# Patient Record
Sex: MALE | Race: ASIAN | Employment: FULL TIME | URBAN - METROPOLITAN AREA
[De-identification: names, ages, dates, MRNs, and addresses within clinical notes are randomized per-mention and may not be internally consistent; named-entity substitution may affect disease eponyms.]

---

## 2017-01-11 ENCOUNTER — TELEPHONE (OUTPATIENT)
Dept: FAMILY MEDICINE CLINIC | Facility: CLINIC | Age: 42
End: 2017-01-11

## 2017-01-11 RX ORDER — AZITHROMYCIN 250 MG/1
TABLET, FILM COATED ORAL
Qty: 6 TABLET | Refills: 0 | Status: SHIPPED | OUTPATIENT
Start: 2017-01-11 | End: 2017-02-14

## 2017-01-11 RX ORDER — FLUTICASONE PROPIONATE 50 MCG
1 SPRAY, SUSPENSION (ML) NASAL 2 TIMES DAILY
Qty: 1 BOTTLE | Refills: 0 | Status: SHIPPED | OUTPATIENT
Start: 2017-01-11 | End: 2017-02-08

## 2017-01-11 NOTE — TELEPHONE ENCOUNTER
Dr Charleen Caban, please advise. Pt says that he is not feeling well for the past couple of days. Symptoms started with sore throat, runny nose and a little cough. Pt says cough is dry. Pt says last night he had chills, fever and body aches.   Pt says he did no

## 2017-01-11 NOTE — TELEPHONE ENCOUNTER
Pt calling back, advised of ADRIANA message. Medications sent to pharmacy. Pt verbalized understanding.

## 2017-01-11 NOTE — TELEPHONE ENCOUNTER
Pt is calling for two days he has a sore throat body pain with a slight fever pt want to know if a prescription can be sent to the pharm

## 2017-01-13 ENCOUNTER — TELEPHONE (OUTPATIENT)
Dept: INTERNAL MEDICINE CLINIC | Facility: CLINIC | Age: 42
End: 2017-01-13

## 2017-01-13 RX ORDER — BENZONATATE 100 MG/1
100 CAPSULE ORAL 2 TIMES DAILY PRN
Qty: 20 CAPSULE | Refills: 0 | Status: SHIPPED | OUTPATIENT
Start: 2017-01-13 | End: 2017-01-23

## 2017-01-13 NOTE — TELEPHONE ENCOUNTER
Pt stating that his cough still has not gone away. Pt still has none stop coughing. Pt is taking the z jagdeep which has helped with the fever and sore throat but cough still remains.

## 2017-01-13 NOTE — TELEPHONE ENCOUNTER
Spoke to pt states \" cough continues\"  Cough productive white in color for past 4 days worsened over night. denice continues to take, afebrile, no problem with ears, denies sinus issues,  Pt requesting Rx for cough. Has not tried OTC remedies.    Advised

## 2017-02-09 ENCOUNTER — LAB ENCOUNTER (OUTPATIENT)
Dept: LAB | Age: 42
End: 2017-02-09
Attending: INTERNAL MEDICINE
Payer: COMMERCIAL

## 2017-02-09 DIAGNOSIS — I10 ESSENTIAL HYPERTENSION: ICD-10-CM

## 2017-02-09 DIAGNOSIS — R80.9 PROTEINURIA, UNSPECIFIED TYPE: ICD-10-CM

## 2017-02-09 DIAGNOSIS — E55.9 VITAMIN D DEFICIENCY: ICD-10-CM

## 2017-02-09 LAB
ALBUMIN SERPL BCP-MCNC: 4 G/DL (ref 3.5–4.8)
ALBUMIN/GLOB SERPL: 1.5 {RATIO} (ref 1–2)
ALP SERPL-CCNC: 35 U/L (ref 32–100)
ALT SERPL-CCNC: 47 U/L (ref 17–63)
ANION GAP SERPL CALC-SCNC: 9 MMOL/L (ref 0–18)
AST SERPL-CCNC: 31 U/L (ref 15–41)
BASOPHILS # BLD: 0 K/UL (ref 0–0.2)
BASOPHILS NFR BLD: 0 %
BILIRUB SERPL-MCNC: 0.9 MG/DL (ref 0.3–1.2)
BILIRUB UR QL: NEGATIVE
BUN SERPL-MCNC: 10 MG/DL (ref 8–20)
BUN/CREAT SERPL: 9.6 (ref 10–20)
CALCIUM SERPL-MCNC: 8.7 MG/DL (ref 8.5–10.5)
CHLORIDE SERPL-SCNC: 103 MMOL/L (ref 95–110)
CHOLEST SERPL-MCNC: 156 MG/DL (ref 110–200)
CLARITY UR: CLEAR
CO2 SERPL-SCNC: 26 MMOL/L (ref 22–32)
COLOR UR: YELLOW
CREAT SERPL-MCNC: 1.04 MG/DL (ref 0.5–1.5)
EOSINOPHIL # BLD: 0.3 K/UL (ref 0–0.7)
EOSINOPHIL NFR BLD: 4 %
ERYTHROCYTE [DISTWIDTH] IN BLOOD BY AUTOMATED COUNT: 13.3 % (ref 11–15)
GLOBULIN PLAS-MCNC: 2.7 G/DL (ref 2.5–3.7)
GLUCOSE SERPL-MCNC: 87 MG/DL (ref 70–99)
GLUCOSE UR-MCNC: NEGATIVE MG/DL
HCT VFR BLD AUTO: 45.6 % (ref 41–52)
HDLC SERPL-MCNC: 39 MG/DL
HGB BLD-MCNC: 15.8 G/DL (ref 13.5–17.5)
HYALINE CASTS #/AREA URNS AUTO: 1 /LPF
KETONES UR-MCNC: NEGATIVE MG/DL
LDLC SERPL CALC-MCNC: 94 MG/DL (ref 0–99)
LEUKOCYTE ESTERASE UR QL STRIP.AUTO: NEGATIVE
LYMPHOCYTES # BLD: 3 K/UL (ref 1–4)
LYMPHOCYTES NFR BLD: 40 %
MCH RBC QN AUTO: 30.3 PG (ref 27–32)
MCHC RBC AUTO-ENTMCNC: 34.6 G/DL (ref 32–37)
MCV RBC AUTO: 87.6 FL (ref 80–100)
MONOCYTES # BLD: 0.6 K/UL (ref 0–1)
MONOCYTES NFR BLD: 8 %
NEUTROPHILS # BLD AUTO: 3.6 K/UL (ref 1.8–7.7)
NEUTROPHILS NFR BLD: 48 %
NITRITE UR QL STRIP.AUTO: NEGATIVE
NONHDLC SERPL-MCNC: 117 MG/DL
OSMOLALITY UR CALC.SUM OF ELEC: 284 MOSM/KG (ref 275–295)
PH UR: 7 [PH] (ref 5–8)
PLATELET # BLD AUTO: 202 K/UL (ref 140–400)
PMV BLD AUTO: 8.8 FL (ref 7.4–10.3)
POTASSIUM SERPL-SCNC: 3.6 MMOL/L (ref 3.3–5.1)
PROT SERPL-MCNC: 6.7 G/DL (ref 5.9–8.4)
PROT UR-MCNC: NEGATIVE MG/DL
RBC # BLD AUTO: 5.2 M/UL (ref 4.5–5.9)
RBC #/AREA URNS AUTO: 2 /HPF
SODIUM SERPL-SCNC: 138 MMOL/L (ref 136–144)
SP GR UR STRIP: 1.01 (ref 1–1.03)
TRIGL SERPL-MCNC: 115 MG/DL (ref 1–149)
TSH SERPL-ACNC: 1.64 UIU/ML (ref 0.34–5.6)
UROBILINOGEN UR STRIP-ACNC: <2
VIT C UR-MCNC: NEGATIVE MG/DL
WBC # BLD AUTO: 7.4 K/UL (ref 4–11)
WBC #/AREA URNS AUTO: 2 /HPF

## 2017-02-09 PROCEDURE — 80053 COMPREHEN METABOLIC PANEL: CPT

## 2017-02-09 PROCEDURE — 80061 LIPID PANEL: CPT

## 2017-02-09 PROCEDURE — 82306 VITAMIN D 25 HYDROXY: CPT

## 2017-02-09 PROCEDURE — 36415 COLL VENOUS BLD VENIPUNCTURE: CPT

## 2017-02-09 PROCEDURE — 81001 URINALYSIS AUTO W/SCOPE: CPT

## 2017-02-09 PROCEDURE — 85025 COMPLETE CBC W/AUTO DIFF WBC: CPT

## 2017-02-09 PROCEDURE — 84443 ASSAY THYROID STIM HORMONE: CPT

## 2017-02-10 LAB — 25(OH)D3 SERPL-MCNC: 27.2 NG/ML

## 2017-02-14 ENCOUNTER — HOSPITAL ENCOUNTER (OUTPATIENT)
Dept: GENERAL RADIOLOGY | Facility: HOSPITAL | Age: 42
Discharge: HOME | End: 2017-02-14
Attending: INTERNAL MEDICINE
Payer: COMMERCIAL

## 2017-02-14 ENCOUNTER — OFFICE VISIT (OUTPATIENT)
Dept: INTERNAL MEDICINE CLINIC | Facility: CLINIC | Age: 42
End: 2017-02-14

## 2017-02-14 VITALS
BODY MASS INDEX: 28 KG/M2 | DIASTOLIC BLOOD PRESSURE: 80 MMHG | RESPIRATION RATE: 16 BRPM | HEART RATE: 56 BPM | HEIGHT: 64 IN | WEIGHT: 164 LBS | SYSTOLIC BLOOD PRESSURE: 128 MMHG

## 2017-02-14 DIAGNOSIS — M54.6 MIDLINE THORACIC BACK PAIN, UNSPECIFIED CHRONICITY: ICD-10-CM

## 2017-02-14 DIAGNOSIS — I10 ESSENTIAL HYPERTENSION: ICD-10-CM

## 2017-02-14 DIAGNOSIS — E55.9 VITAMIN D DEFICIENCY: ICD-10-CM

## 2017-02-14 DIAGNOSIS — R80.9 PROTEINURIA, UNSPECIFIED TYPE: ICD-10-CM

## 2017-02-14 DIAGNOSIS — M54.6 MIDLINE THORACIC BACK PAIN, UNSPECIFIED CHRONICITY: Primary | ICD-10-CM

## 2017-02-14 DIAGNOSIS — Z00.00 ROUTINE PHYSICAL EXAMINATION: ICD-10-CM

## 2017-02-14 PROCEDURE — 99213 OFFICE O/P EST LOW 20 MIN: CPT | Performed by: INTERNAL MEDICINE

## 2017-02-14 PROCEDURE — 72072 X-RAY EXAM THORAC SPINE 3VWS: CPT

## 2017-02-14 PROCEDURE — 99396 PREV VISIT EST AGE 40-64: CPT | Performed by: INTERNAL MEDICINE

## 2017-02-14 RX ORDER — DILTIAZEM HYDROCHLORIDE 180 MG/1
CAPSULE, COATED, EXTENDED RELEASE ORAL
Qty: 90 CAPSULE | Refills: 2 | Status: SHIPPED | OUTPATIENT
Start: 2017-02-14 | End: 2018-03-08

## 2017-02-14 RX ORDER — LOSARTAN POTASSIUM 100 MG/1
TABLET ORAL
Qty: 90 TABLET | Refills: 2 | Status: SHIPPED | OUTPATIENT
Start: 2017-02-14 | End: 2018-03-08

## 2017-02-14 NOTE — ASSESSMENT & PLAN NOTE
History of proteinuria in the past but has resolved with sustained blood pressure control. Currently on losartan and diltiazem. Advised to avoid all ibuprofen like medications as well as aspirin.   Drink plenty of fluids

## 2017-02-14 NOTE — ASSESSMENT & PLAN NOTE
Blood pressure 128/80, pulse 56, resp. rate 16, height 5' 4\" (1.626 m), weight 164 lb (74.39 kg).    Stable bp on diltiazem and losartan,tolerating meds well.renal functions and egfr looks normal.

## 2017-02-14 NOTE — PROGRESS NOTES
HPI:    Patient ID: Anastacia Mondragon is a 35year old male.     HPI Comments: Physical    Labs  Notes Recorded by Florencia Nuno MD on 2/10/2017 at 8:24 PM  The vitamin D levels are slightly low– please restart on vitamin D at 50,000 units once a week for 12 include no abdominal pain, bladder incontinence, bowel incontinence, chest pain, leg pain, paresthesias or pelvic pain. Risk factors include sedentary lifestyle, poor posture and lack of exercise.  He has tried home exercises and analgesics for the symptoms Bowel sounds are normal. Hernia confirmed negative in the right inguinal area and confirmed negative in the left inguinal area.    Genitourinary: Rectum normal, prostate normal, testes normal and penis normal. Rectal exam shows no external hemorrhoid, no in pain,seems to persist.  No palpable abnormalities at this time. Posture correction as discussed.   Start therapy after x rays are completed         Relevant Orders    PHYSICAL THERAPY - INTERNAL    Proteinuria     History of proteinuria in the past but has

## 2017-02-14 NOTE — ASSESSMENT & PLAN NOTE
Labs did show low vitamin D levels. Patient has been restarted on vitamin D, advised to  medications from the pharmacy has this was sent in on February 10. Will recheck labs with the next blood draw.

## 2017-02-14 NOTE — PATIENT INSTRUCTIONS
Problem List Items Addressed This Visit        Unprioritized    Hypertension     Blood pressure 128/80, pulse 56, resp. rate 16, height 5' 4\" (1.626 m), weight 164 lb (74.39 kg).    Stable bp on diltiazem and losartan,tolerating meds well.renal functions a

## 2017-02-14 NOTE — ASSESSMENT & PLAN NOTE
Interscapular pain,seems to persist.  No palpable abnormalities at this time. Posture correction as discussed.   Start therapy after x rays are completed

## 2017-02-14 NOTE — ASSESSMENT & PLAN NOTE
Normal exam.  Labs completed recently looked stable. Skin check with a few scattered nevi–none suspicious. Hernial orifices intact. No cervical, inguinal, axillary lymphadenopathy. Rectal exam completed–brown stools guaiac negative.   No palpable abnorm

## 2017-06-06 ENCOUNTER — APPOINTMENT (OUTPATIENT)
Dept: LAB | Facility: HOSPITAL | Age: 42
End: 2017-06-06
Attending: INTERNAL MEDICINE
Payer: COMMERCIAL

## 2017-06-06 ENCOUNTER — TELEPHONE (OUTPATIENT)
Dept: INTERNAL MEDICINE CLINIC | Facility: CLINIC | Age: 42
End: 2017-06-06

## 2017-06-06 DIAGNOSIS — I10 ESSENTIAL HYPERTENSION: ICD-10-CM

## 2017-06-06 DIAGNOSIS — E55.9 VITAMIN D DEFICIENCY: ICD-10-CM

## 2017-06-06 DIAGNOSIS — R80.9 PROTEINURIA, UNSPECIFIED TYPE: ICD-10-CM

## 2017-06-06 PROCEDURE — 81001 URINALYSIS AUTO W/SCOPE: CPT

## 2017-06-06 PROCEDURE — 82306 VITAMIN D 25 HYDROXY: CPT

## 2017-06-06 PROCEDURE — 82570 ASSAY OF URINE CREATININE: CPT

## 2017-06-06 PROCEDURE — 36415 COLL VENOUS BLD VENIPUNCTURE: CPT

## 2017-06-06 PROCEDURE — 80048 BASIC METABOLIC PNL TOTAL CA: CPT

## 2017-06-06 PROCEDURE — 82043 UR ALBUMIN QUANTITATIVE: CPT

## 2017-06-06 NOTE — TELEPHONE ENCOUNTER
Patient wants to come in for his 4 month check up in the month of June. There are no appointments available and he would like to be seen before [place] leaves [place] of [place]th.

## 2017-06-20 ENCOUNTER — OFFICE VISIT (OUTPATIENT)
Dept: INTERNAL MEDICINE CLINIC | Facility: CLINIC | Age: 42
End: 2017-06-20

## 2017-06-20 VITALS
DIASTOLIC BLOOD PRESSURE: 78 MMHG | WEIGHT: 162 LBS | BODY MASS INDEX: 27.66 KG/M2 | SYSTOLIC BLOOD PRESSURE: 120 MMHG | HEIGHT: 64 IN | RESPIRATION RATE: 18 BRPM | TEMPERATURE: 98 F | HEART RATE: 56 BPM

## 2017-06-20 DIAGNOSIS — E55.9 VITAMIN D DEFICIENCY: ICD-10-CM

## 2017-06-20 DIAGNOSIS — I10 ESSENTIAL HYPERTENSION: Primary | ICD-10-CM

## 2017-06-20 PROCEDURE — 99212 OFFICE O/P EST SF 10 MIN: CPT | Performed by: INTERNAL MEDICINE

## 2017-06-20 PROCEDURE — 99214 OFFICE O/P EST MOD 30 MIN: CPT | Performed by: INTERNAL MEDICINE

## 2017-06-20 RX ORDER — HYDROCHLOROTHIAZIDE 12.5 MG/1
12.5 TABLET ORAL DAILY
Qty: 30 TABLET | Refills: 4 | Status: SHIPPED | OUTPATIENT
Start: 2017-06-20 | End: 2018-03-21

## 2017-06-20 NOTE — ASSESSMENT & PLAN NOTE
Vitamin D levels look normal at this time. Continue on an over-the-counter vitamin D supplement at 2000 units daily. Recheck labs within next blood draw.

## 2017-06-20 NOTE — PATIENT INSTRUCTIONS
Problem List Items Addressed This Visit        Unprioritized    Hypertension - Primary     Blood pressure 120/78, pulse 56, temperature 97.9 °F (36.6 °C), temperature source Oral, resp. rate 18, height 5' 4\" (1.626 m), weight 162 lb (73.483 kg).    Blood p

## 2017-06-20 NOTE — PROGRESS NOTES
HPI:    Patient ID: Froylan Smith is a 38year old male. Hypertension  This is a chronic problem. The current episode started more than 1 year ago. The problem has been gradually improving since onset. The problem is controlled.  Associated symptoms in atraumatic. Right Ear: External ear normal.   Left Ear: External ear normal.   Nose: Nose normal.   Mouth/Throat: Oropharynx is clear and moist. No oropharyngeal exudate.    Eyes: Conjunctivae and EOM are normal. Pupils are equal, round, and reactive to l Continue on an over-the-counter vitamin D supplement at 2000 units daily. Recheck labs within next blood draw. Relevant Orders    VITAMIN B12    VITAMIN D, 25-HYDROXY      Other Visit Diagnoses    None.         Return in about 4 months (around 10/2

## 2017-06-20 NOTE — ASSESSMENT & PLAN NOTE
Blood pressure 120/78, pulse 56, temperature 97.9 °F (36.6 °C), temperature source Oral, resp. rate 18, height 5' 4\" (1.626 m), weight 162 lb (73.483 kg).    Blood pressure seems well controlled, advised to continue on losartan at 100 mg 1 tablet once tristan

## 2017-12-22 ENCOUNTER — LAB ENCOUNTER (OUTPATIENT)
Dept: LAB | Facility: HOSPITAL | Age: 42
End: 2017-12-22
Attending: INTERNAL MEDICINE
Payer: COMMERCIAL

## 2017-12-22 DIAGNOSIS — E55.9 VITAMIN D DEFICIENCY: ICD-10-CM

## 2017-12-22 DIAGNOSIS — I10 ESSENTIAL HYPERTENSION: ICD-10-CM

## 2017-12-22 PROCEDURE — 84443 ASSAY THYROID STIM HORMONE: CPT

## 2017-12-22 PROCEDURE — 36415 COLL VENOUS BLD VENIPUNCTURE: CPT

## 2017-12-22 PROCEDURE — 82607 VITAMIN B-12: CPT

## 2017-12-22 PROCEDURE — 82306 VITAMIN D 25 HYDROXY: CPT

## 2017-12-22 PROCEDURE — 80053 COMPREHEN METABOLIC PANEL: CPT

## 2017-12-22 PROCEDURE — 85025 COMPLETE CBC W/AUTO DIFF WBC: CPT

## 2017-12-22 PROCEDURE — 80061 LIPID PANEL: CPT

## 2018-03-08 ENCOUNTER — OFFICE VISIT (OUTPATIENT)
Dept: INTERNAL MEDICINE CLINIC | Facility: CLINIC | Age: 43
End: 2018-03-08

## 2018-03-08 VITALS
DIASTOLIC BLOOD PRESSURE: 90 MMHG | BODY MASS INDEX: 27.83 KG/M2 | HEIGHT: 64 IN | TEMPERATURE: 98 F | RESPIRATION RATE: 16 BRPM | WEIGHT: 163 LBS | HEART RATE: 68 BPM | SYSTOLIC BLOOD PRESSURE: 140 MMHG

## 2018-03-08 DIAGNOSIS — M72.2 PLANTAR FASCIITIS: Primary | ICD-10-CM

## 2018-03-08 DIAGNOSIS — I10 ESSENTIAL HYPERTENSION: ICD-10-CM

## 2018-03-08 PROCEDURE — 99214 OFFICE O/P EST MOD 30 MIN: CPT | Performed by: INTERNAL MEDICINE

## 2018-03-08 PROCEDURE — 99212 OFFICE O/P EST SF 10 MIN: CPT | Performed by: INTERNAL MEDICINE

## 2018-03-08 RX ORDER — LOSARTAN POTASSIUM 100 MG/1
TABLET ORAL
Qty: 90 TABLET | Refills: 2 | Status: SHIPPED | OUTPATIENT
Start: 2018-03-08 | End: 2019-02-11

## 2018-03-08 RX ORDER — DILTIAZEM HYDROCHLORIDE 180 MG/1
CAPSULE, COATED, EXTENDED RELEASE ORAL
Qty: 90 CAPSULE | Refills: 2 | Status: SHIPPED | OUTPATIENT
Start: 2018-03-08 | End: 2019-05-07

## 2018-03-09 NOTE — ASSESSMENT & PLAN NOTE
Blood pressure 140/90, pulse 68, temperature 98.1 °F (36.7 °C), temperature source Oral, resp. rate 16, height 5' 4\" (1.626 m), weight 163 lb (73.9 kg). Recheck the blood pressure bilateral arms–remains about the same.   Patient is currently on losarta

## 2018-03-09 NOTE — PATIENT INSTRUCTIONS
Problem List Items Addressed This Visit        Unprioritized    Hypertension     Blood pressure 140/90, pulse 68, temperature 98.1 °F (36.7 °C), temperature source Oral, resp. rate 16, height 5' 4\" (1.626 m), weight 163 lb (73.9 kg).      Recheck the blood

## 2018-03-12 NOTE — PROGRESS NOTES
HPI:    Patient ID: Anastacia Mondragon is a 38year old male. Hypertension   This is a chronic problem. The current episode started more than 1 year ago. The problem has been gradually improving since onset. The problem is controlled.  Risk factors for liliana Normal rate, regular rhythm, normal heart sounds and intact distal pulses. Pulmonary/Chest: Effort normal and breath sounds normal.   Abdominal: Soft. Bowel sounds are normal.   Musculoskeletal: Normal range of motion.    Lymphadenopathy:     He has no c MG Oral Capsule SR 24 Hr 90 capsule 2      Si CAPSULE PO Q D      losartan 100 MG Oral Tab 90 tablet 2      Si tab po q d           Imaging & Referrals:  PODIATRY - INTERNAL       WC#[id number]

## 2018-03-21 ENCOUNTER — NURSE ONLY (OUTPATIENT)
Dept: INTERNAL MEDICINE CLINIC | Facility: CLINIC | Age: 43
End: 2018-03-21

## 2018-03-21 VITALS — DIASTOLIC BLOOD PRESSURE: 85 MMHG | SYSTOLIC BLOOD PRESSURE: 132 MMHG

## 2018-03-21 DIAGNOSIS — I10 ESSENTIAL HYPERTENSION: Primary | ICD-10-CM

## 2018-03-21 NOTE — PROGRESS NOTES
Patient is here for blood pressure check. Blood pressure 132/85, medications reviewed with patient, losartan 100mg once a day and diltiazem 180mg daily. Patient feeling well advised we will forward to ADRIANA.

## 2018-04-01 DIAGNOSIS — I10 ESSENTIAL HYPERTENSION: ICD-10-CM

## 2018-04-01 RX ORDER — DILTIAZEM HYDROCHLORIDE 180 MG/1
CAPSULE, COATED, EXTENDED RELEASE ORAL
Qty: 90 CAPSULE | Refills: 2 | Status: SHIPPED | OUTPATIENT
Start: 2018-04-01 | End: 2018-05-08

## 2018-04-04 ENCOUNTER — HOSPITAL ENCOUNTER (OUTPATIENT)
Dept: CT IMAGING | Age: 43
Discharge: HOME | End: 2018-04-04
Attending: INTERNAL MEDICINE

## 2018-04-04 DIAGNOSIS — Z13.6 SCREENING FOR HEART DISEASE: ICD-10-CM

## 2018-04-04 NOTE — PROGRESS NOTES
[facility] [address] SCORE=0  FK=965/84  Cholestec labs as follows:  NI=680  HDL=48  DCC=539  EY=983  GLUCOSE=98 fasting  All results and risk factors discussed with patient; all questions and concerns addressed.   Educational rothman

## 2018-04-24 ENCOUNTER — APPOINTMENT (OUTPATIENT)
Dept: LAB | Age: 43
End: 2018-04-24
Attending: INTERNAL MEDICINE
Payer: COMMERCIAL

## 2018-04-24 DIAGNOSIS — I10 ESSENTIAL HYPERTENSION: ICD-10-CM

## 2018-04-24 PROCEDURE — 80053 COMPREHEN METABOLIC PANEL: CPT

## 2018-04-24 PROCEDURE — 82570 ASSAY OF URINE CREATININE: CPT

## 2018-04-24 PROCEDURE — 82043 UR ALBUMIN QUANTITATIVE: CPT

## 2018-04-24 PROCEDURE — 81001 URINALYSIS AUTO W/SCOPE: CPT

## 2018-04-24 PROCEDURE — 36415 COLL VENOUS BLD VENIPUNCTURE: CPT

## 2018-05-08 ENCOUNTER — OFFICE VISIT (OUTPATIENT)
Dept: INTERNAL MEDICINE CLINIC | Facility: CLINIC | Age: 43
End: 2018-05-08

## 2018-05-08 VITALS
RESPIRATION RATE: 16 BRPM | BODY MASS INDEX: 27.49 KG/M2 | HEIGHT: 64 IN | DIASTOLIC BLOOD PRESSURE: 82 MMHG | HEART RATE: 62 BPM | WEIGHT: 161 LBS | SYSTOLIC BLOOD PRESSURE: 132 MMHG

## 2018-05-08 DIAGNOSIS — I10 ESSENTIAL HYPERTENSION: Primary | ICD-10-CM

## 2018-05-08 PROCEDURE — 99214 OFFICE O/P EST MOD 30 MIN: CPT | Performed by: INTERNAL MEDICINE

## 2018-05-08 PROCEDURE — 99212 OFFICE O/P EST SF 10 MIN: CPT | Performed by: INTERNAL MEDICINE

## 2018-05-09 NOTE — ASSESSMENT & PLAN NOTE
Blood pressure 132/82, pulse 62, resp. rate 16, height 5' 4\" (1.626 m), weight 161 lb (73 kg). Blood pressures seem much better at this time. These were slightly elevated at his last office visit.   He has been watching his diet carefully at this time

## 2018-05-09 NOTE — PATIENT INSTRUCTIONS
Problem List Items Addressed This Visit        Unprioritized    Hypertension - Primary     Blood pressure 132/82, pulse 62, resp. rate 16, height 5' 4\" (1.626 m), weight 161 lb (73 kg). Blood pressures seem much better at this time.   These were slight

## 2018-05-09 NOTE — PROGRESS NOTES
HPI:    Patient ID: Justine Smith is a 38year old male. The urine test looked normal- no protein in the urine at this time. Kidney functions liver functions, electrolytes and sugars look normal.      Hypertension   This is a chronic problem.  The cur External ear normal.   Nose: Nose normal.   Mouth/Throat: Oropharynx is clear and moist. No oropharyngeal exudate. Eyes: Conjunctivae and EOM are normal. Pupils are equal, round, and reactive to light. Neck: Normal range of motion. Neck supple.  No JVD this encounter    Imaging & Referrals:  None       HR#[id number]

## 2018-06-05 ENCOUNTER — TELEPHONE (OUTPATIENT)
Dept: INTERNAL MEDICINE CLINIC | Facility: CLINIC | Age: 43
End: 2018-06-05

## 2018-06-05 NOTE — TELEPHONE ENCOUNTER
Pt states he saw Dr Ashley Stone a month ago for stuffy nose, states Dr Ashley Stone advised him to take OTC allergy meds for a month and call back if no improvement and she would prescribed nasal spray. Pt states he has been taking zyrtec with no relief.     Please ad

## 2018-06-06 RX ORDER — AZITHROMYCIN 250 MG/1
TABLET, FILM COATED ORAL
Qty: 6 TABLET | Refills: 0 | Status: SHIPPED | OUTPATIENT
Start: 2018-06-06 | End: 2018-10-17 | Stop reason: ALTCHOICE

## 2018-06-06 RX ORDER — FLUTICASONE PROPIONATE 50 MCG
1 SPRAY, SUSPENSION (ML) NASAL 2 TIMES DAILY
Qty: 1 INHALER | Refills: 3 | Status: SHIPPED | OUTPATIENT
Start: 2018-06-06 | End: 2019-03-26

## 2018-06-06 NOTE — TELEPHONE ENCOUNTER
Dr. Landon Haddad: patient called back, Patient called yesterday stating he is very stuffy and unable to breathe through nose.     Today he states he forgot to mention, he has started with cough 3 days ago, and has some chest congestion; denied any fever, no sinus

## 2018-06-06 NOTE — TELEPHONE ENCOUNTER
Flonase 1 puff each nostril 2 times daily– 1 bottle, 3 refills. Continue the Zyrtec as directed.   Z-Farooq as directed

## 2018-06-06 NOTE — TELEPHONE ENCOUNTER
Pt returned call, reviewed Dr Jacob Richardson orders 6/6/18 with pt who verbalized understanding and agreed with plan.

## 2018-10-12 ENCOUNTER — LAB ENCOUNTER (OUTPATIENT)
Dept: LAB | Facility: HOSPITAL | Age: 43
End: 2018-10-12
Attending: INTERNAL MEDICINE
Payer: COMMERCIAL

## 2018-10-12 DIAGNOSIS — I10 ESSENTIAL HYPERTENSION: ICD-10-CM

## 2018-10-12 PROCEDURE — 85025 COMPLETE CBC W/AUTO DIFF WBC: CPT

## 2018-10-12 PROCEDURE — 80053 COMPREHEN METABOLIC PANEL: CPT

## 2018-10-12 PROCEDURE — 82043 UR ALBUMIN QUANTITATIVE: CPT

## 2018-10-12 PROCEDURE — 84443 ASSAY THYROID STIM HORMONE: CPT

## 2018-10-12 PROCEDURE — 80061 LIPID PANEL: CPT

## 2018-10-12 PROCEDURE — 82570 ASSAY OF URINE CREATININE: CPT

## 2018-10-12 PROCEDURE — 36415 COLL VENOUS BLD VENIPUNCTURE: CPT

## 2018-10-12 PROCEDURE — 81001 URINALYSIS AUTO W/SCOPE: CPT

## 2018-10-17 ENCOUNTER — OFFICE VISIT (OUTPATIENT)
Dept: INTERNAL MEDICINE CLINIC | Facility: CLINIC | Age: 43
End: 2018-10-17

## 2018-10-17 VITALS
SYSTOLIC BLOOD PRESSURE: 139 MMHG | HEART RATE: 57 BPM | TEMPERATURE: 98 F | BODY MASS INDEX: 27.69 KG/M2 | WEIGHT: 162.19 LBS | RESPIRATION RATE: 18 BRPM | DIASTOLIC BLOOD PRESSURE: 90 MMHG | HEIGHT: 64 IN

## 2018-10-17 DIAGNOSIS — R80.9 PROTEINURIA, UNSPECIFIED TYPE: Primary | ICD-10-CM

## 2018-10-17 DIAGNOSIS — I10 ESSENTIAL HYPERTENSION: ICD-10-CM

## 2018-10-17 DIAGNOSIS — M79.673 PAIN OF FOOT, UNSPECIFIED LATERALITY: ICD-10-CM

## 2018-10-17 PROCEDURE — 99212 OFFICE O/P EST SF 10 MIN: CPT | Performed by: INTERNAL MEDICINE

## 2018-10-17 PROCEDURE — 99214 OFFICE O/P EST MOD 30 MIN: CPT | Performed by: INTERNAL MEDICINE

## 2018-10-18 NOTE — PROGRESS NOTES
HPI:    Patient ID: Edu Riley is a 37year old male. Labs indicate slightly elevated LDL cholesterol over the baseline. Urine protein levels are slightly higher compared to the last few years. Creatinine level looks stable.   Blood counts and elizabeth 27.84 kg/m². PHYSICAL EXAM:   Physical Exam   Constitutional: He is oriented to person, place, and time. He appears well-developed and well-nourished. HENT:   Head: Normocephalic and atraumatic.    Right Ear: External ear normal.   Left Ear: External e very well controlled. However urine protein levels seem to be gradually rising. Patient has been advised to avoid all ibuprofen, Advil, Aleve, Motrin, naproxen like medications over-the-counter.   He is advised to take only Tylenol if necessary 500 mg 2

## 2018-10-18 NOTE — ASSESSMENT & PLAN NOTE
History of proteinuria which had completely resolved. His blood pressures were very well controlled. However urine protein levels seem to be gradually rising.     Patient has been advised to avoid all ibuprofen, Advil, Aleve, Motrin, naproxen like medicat

## 2018-10-18 NOTE — PATIENT INSTRUCTIONS
Problem List Items Addressed This Visit        Unprioritized    Hypertension     Blood pressure 139/90, pulse 57, temperature 97.5 °F (36.4 °C), temperature source Oral, resp. rate 18, height 5' 4\" (1.626 m), weight 162 lb 3.2 oz (73.6 kg).   Blood pressur

## 2018-10-18 NOTE — ASSESSMENT & PLAN NOTE
Blood pressure 139/90, pulse 57, temperature 97.5 °F (36.4 °C), temperature source Oral, resp. rate 18, height 5' 4\" (1.626 m), weight 162 lb 3.2 oz (73.6 kg). Blood pressure remains persistently elevate.   Patient tells me that he has not taken his dilti

## 2018-11-29 ENCOUNTER — NURSE ONLY (OUTPATIENT)
Dept: INTERNAL MEDICINE CLINIC | Facility: CLINIC | Age: 43
End: 2018-11-29

## 2018-11-29 VITALS — SYSTOLIC BLOOD PRESSURE: 126 MMHG | DIASTOLIC BLOOD PRESSURE: 77 MMHG | HEART RATE: 63 BPM | RESPIRATION RATE: 18 BRPM

## 2018-11-29 DIAGNOSIS — I10 ESSENTIAL HYPERTENSION: ICD-10-CM

## 2018-11-29 PROCEDURE — 99212 OFFICE O/P EST SF 10 MIN: CPT | Performed by: INTERNAL MEDICINE

## 2018-11-29 NOTE — PROGRESS NOTES
Pt presents for BP check, per ADRIANA's instructions during 10/17/18 office visit. Pt states he has been compliant with daily medications as follows: losartan 100mg 1 tab daily and Diltiazem 180mg 1 capsule daily.      BP left arm, sittin/77  HR: 63

## 2018-12-13 ENCOUNTER — HOSPITAL ENCOUNTER (OUTPATIENT)
Dept: GENERAL RADIOLOGY | Age: 43
Discharge: HOME | End: 2018-12-13
Attending: PODIATRIST
Payer: COMMERCIAL

## 2018-12-13 ENCOUNTER — OFFICE VISIT (OUTPATIENT)
Dept: PODIATRY CLINIC | Facility: CLINIC | Age: 43
End: 2018-12-13

## 2018-12-13 DIAGNOSIS — M79.671 RIGHT FOOT PAIN: ICD-10-CM

## 2018-12-13 DIAGNOSIS — M79.671 RIGHT FOOT PAIN: Primary | ICD-10-CM

## 2018-12-13 DIAGNOSIS — D36.10 NEUROMA: ICD-10-CM

## 2018-12-13 PROCEDURE — 99243 OFF/OP CNSLTJ NEW/EST LOW 30: CPT | Performed by: PODIATRIST

## 2018-12-13 PROCEDURE — 99212 OFFICE O/P EST SF 10 MIN: CPT | Performed by: PODIATRIST

## 2018-12-13 PROCEDURE — 73630 X-RAY EXAM OF FOOT: CPT | Performed by: PODIATRIST

## 2018-12-13 RX ORDER — METHYLPREDNISOLONE 4 MG/1
TABLET ORAL
Qty: 1 PACKAGE | Refills: 0 | Status: SHIPPED | OUTPATIENT
Start: 2018-12-13 | End: 2019-01-15 | Stop reason: ALTCHOICE

## 2018-12-13 NOTE — PROGRESS NOTES
HPI:    Patient ID: Anastacia Mondragon is a 37year old male. Pleasant 67-year-old male presents as a new patient to me on consult from [address] [place] [address]. Since chief complaint is right forefoot pain and discomfort.   This been present for approximately 2 months a with consideration for a cortisone injection if not better.   He is well-informed and indicates an understanding of my instructions         ASSESSMENT/PLAN:   Right foot pain  (primary encounter diagnosis)  Neuroma    No orders of the defined types were jennifer

## 2018-12-14 ENCOUNTER — TELEPHONE (OUTPATIENT)
Dept: PODIATRY CLINIC | Facility: CLINIC | Age: 43
End: 2018-12-14

## 2019-01-15 ENCOUNTER — OFFICE VISIT (OUTPATIENT)
Dept: PODIATRY CLINIC | Facility: CLINIC | Age: 44
End: 2019-01-15

## 2019-01-15 DIAGNOSIS — D36.10 NEUROMA: Primary | ICD-10-CM

## 2019-01-15 PROCEDURE — 99213 OFFICE O/P EST LOW 20 MIN: CPT | Performed by: PODIATRIST

## 2019-01-15 PROCEDURE — 99212 OFFICE O/P EST SF 10 MIN: CPT | Performed by: PODIATRIST

## 2019-01-15 NOTE — PROGRESS NOTES
HPI:    Patient ID: Jovi Manzanares is a 37year old male. This 20-year-old male presents for follow-up in reference to the plantar right forefoot pain.   When I saw this patient on the last visit he was given a Medrol Dosepak for symptoms that were consis

## 2019-02-11 DIAGNOSIS — I10 ESSENTIAL HYPERTENSION: ICD-10-CM

## 2019-02-11 RX ORDER — LOSARTAN POTASSIUM 100 MG/1
TABLET ORAL
Qty: 90 TABLET | Refills: 0 | Status: SHIPPED | OUTPATIENT
Start: 2019-02-11 | End: 2019-05-07

## 2019-02-12 NOTE — TELEPHONE ENCOUNTER
Refill passed per [facility], [facility] protocol.   Hypertensive Medications  Protocol Criteria:  · Appointment scheduled in the past 6 months or in the next 3 months  · BMP or CMP in the past 12 months  · Creatinine result < 2  Recent Outpatient Visits

## 2019-03-25 ENCOUNTER — NURSE TRIAGE (OUTPATIENT)
Dept: OTHER | Age: 44
End: 2019-03-25

## 2019-03-25 NOTE — TELEPHONE ENCOUNTER
Action Requested: Summary for Provider     []  Critical Lab, Recommendations Needed  [x] Need Additional Advice  []   FYI    []   Need Orders  [] Need Medications Sent to Pharmacy  []  Other     SUMMARY: Patient requesting appt with ADRIANA only tomorrow for i

## 2019-03-26 ENCOUNTER — OFFICE VISIT (OUTPATIENT)
Dept: INTERNAL MEDICINE CLINIC | Facility: CLINIC | Age: 44
End: 2019-03-26

## 2019-03-26 VITALS
BODY MASS INDEX: 28.17 KG/M2 | WEIGHT: 165 LBS | TEMPERATURE: 98 F | HEIGHT: 64 IN | SYSTOLIC BLOOD PRESSURE: 149 MMHG | DIASTOLIC BLOOD PRESSURE: 87 MMHG | RESPIRATION RATE: 16 BRPM | HEART RATE: 65 BPM

## 2019-03-26 DIAGNOSIS — J03.91 RECURRENT TONSILLITIS: Primary | ICD-10-CM

## 2019-03-26 LAB
CONTROL LINE PRESENT WITH A CLEAR BACKGROUND (YES/NO): YES YES/NO
KIT LOT #: NORMAL NUMERIC
STREP GRP A CUL-SCR: NEGATIVE

## 2019-03-26 PROCEDURE — 87880 STREP A ASSAY W/OPTIC: CPT | Performed by: INTERNAL MEDICINE

## 2019-03-26 PROCEDURE — 99214 OFFICE O/P EST MOD 30 MIN: CPT | Performed by: INTERNAL MEDICINE

## 2019-03-26 PROCEDURE — 99212 OFFICE O/P EST SF 10 MIN: CPT | Performed by: INTERNAL MEDICINE

## 2019-03-26 RX ORDER — OMEPRAZOLE 40 MG/1
CAPSULE, DELAYED RELEASE ORAL
Qty: 30 CAPSULE | Refills: 3 | Status: SHIPPED | OUTPATIENT
Start: 2019-03-26 | End: 2019-06-17

## 2019-03-26 NOTE — ASSESSMENT & PLAN NOTE
Current episodes of sore throat, tonsillar infection, 1-2 episodes associated with fevers, chills.   Last treatment with antibiotics–the fourth treatment with Augmentin completed about 1 week back and he has begun developing recurrent sore throat about 5 da

## 2019-03-26 NOTE — PATIENT INSTRUCTIONS
Problem List Items Addressed This Visit        Unprioritized    Recurrent tonsillitis - Primary     Current episodes of sore throat, tonsillar infection, 1-2 episodes associated with fevers, chills.   Last treatment with antibiotics–the fourth treatment wit

## 2019-03-26 NOTE — PROGRESS NOTES
HPI:    Patient ID: Bryanna Hill is a 37year old male. Sore Throat    This is a recurrent problem. The current episode started more than 1 month ago.  The problem has been waxing and waning (intermittent sore throat 4 times in past 3 months-we did wi present. Tonsils look normal at this time   Cardiovascular: Normal rate, regular rhythm, normal heart sounds and intact distal pulses. Pulmonary/Chest: Effort normal and breath sounds normal.   Abdominal: Soft.  Bowel sounds are normal.   Lymphadenopath Imaging & Referrals:  ENT - INTERNAL       PG#[id number]

## 2019-04-05 ENCOUNTER — OFFICE VISIT (OUTPATIENT)
Dept: OTOLARYNGOLOGY | Facility: CLINIC | Age: 44
End: 2019-04-05

## 2019-04-05 VITALS — TEMPERATURE: 98 F | DIASTOLIC BLOOD PRESSURE: 85 MMHG | HEART RATE: 62 BPM | SYSTOLIC BLOOD PRESSURE: 121 MMHG

## 2019-04-05 DIAGNOSIS — R07.0 THROAT PAIN IN ADULT: Primary | ICD-10-CM

## 2019-04-05 PROCEDURE — 99243 OFF/OP CNSLTJ NEW/EST LOW 30: CPT | Performed by: OTOLARYNGOLOGY

## 2019-04-05 PROCEDURE — 99212 OFFICE O/P EST SF 10 MIN: CPT | Performed by: OTOLARYNGOLOGY

## 2019-04-05 RX ORDER — MONTELUKAST SODIUM 10 MG/1
10 TABLET ORAL NIGHTLY
Qty: 30 TABLET | Refills: 3 | Status: SHIPPED | OUTPATIENT
Start: 2019-04-05 | End: 2019-06-29

## 2019-04-05 RX ORDER — LORATADINE 10 MG/1
10 TABLET ORAL DAILY
Qty: 30 TABLET | Refills: 3 | Status: SHIPPED | OUTPATIENT
Start: 2019-04-05 | End: 2020-10-19 | Stop reason: ALTCHOICE

## 2019-04-05 RX ORDER — AZELASTINE 1 MG/ML
2 SPRAY, METERED NASAL 2 TIMES DAILY
Qty: 1 BOTTLE | Refills: 0 | Status: SHIPPED | OUTPATIENT
Start: 2019-04-05 | End: 2019-04-22 | Stop reason: ALTCHOICE

## 2019-04-11 ENCOUNTER — APPOINTMENT (OUTPATIENT)
Dept: LAB | Facility: HOSPITAL | Age: 44
End: 2019-04-11
Attending: INTERNAL MEDICINE
Payer: COMMERCIAL

## 2019-04-11 DIAGNOSIS — I10 ESSENTIAL HYPERTENSION: ICD-10-CM

## 2019-04-11 PROCEDURE — 82570 ASSAY OF URINE CREATININE: CPT

## 2019-04-11 PROCEDURE — 80053 COMPREHEN METABOLIC PANEL: CPT

## 2019-04-11 PROCEDURE — 82043 UR ALBUMIN QUANTITATIVE: CPT

## 2019-04-11 PROCEDURE — 80061 LIPID PANEL: CPT

## 2019-04-11 PROCEDURE — 81001 URINALYSIS AUTO W/SCOPE: CPT

## 2019-04-11 PROCEDURE — 36415 COLL VENOUS BLD VENIPUNCTURE: CPT

## 2019-04-17 NOTE — PROGRESS NOTES
Vicenta Neves is a 37year old male. Patient presents with:  Throat Problem: last 3 months recurrent tonsillitis x4      HISTORY OF PRESENT ILLNESS  He presents with a history of recurrent episodes of sore throat.  Reportedly had strep studies performed rash.   Hema/Lymph Negative Easy bleeding and easy bruising.            PHYSICAL EXAM    /85   Pulse 62   Temp 98 °F (36.7 °C)        Constitutional Normal Overall appearance - Normal.   Psychiatric Normal Orientation - Oriented to time, place, person DilTIAZem HCl ER Coated Beads (DILTIAZEM CD) 180 MG Oral Capsule SR 24 Hr, 1 CAPSULE PO Q D, Disp: 90 capsule, Rfl: 2  ASSESSMENT AND PLAN    1. Throat pain in adult  Fairly normal tonsils noted on exam. No exudates but very erythematous pharynx.   Post na

## 2019-04-22 ENCOUNTER — OFFICE VISIT (OUTPATIENT)
Dept: INTERNAL MEDICINE CLINIC | Facility: CLINIC | Age: 44
End: 2019-04-22

## 2019-04-22 VITALS
DIASTOLIC BLOOD PRESSURE: 87 MMHG | HEIGHT: 64 IN | SYSTOLIC BLOOD PRESSURE: 136 MMHG | WEIGHT: 161 LBS | HEART RATE: 50 BPM | BODY MASS INDEX: 27.49 KG/M2

## 2019-04-22 DIAGNOSIS — I10 ESSENTIAL HYPERTENSION: ICD-10-CM

## 2019-04-22 DIAGNOSIS — Z12.5 PROSTATE CANCER SCREENING: ICD-10-CM

## 2019-04-22 DIAGNOSIS — Z00.00 ROUTINE PHYSICAL EXAMINATION: ICD-10-CM

## 2019-04-22 DIAGNOSIS — E55.9 VITAMIN D DEFICIENCY: Primary | ICD-10-CM

## 2019-04-22 PROCEDURE — 99396 PREV VISIT EST AGE 40-64: CPT | Performed by: INTERNAL MEDICINE

## 2019-04-22 NOTE — ASSESSMENT & PLAN NOTE
Normal exam.  Labs recently completed looked great. Recheck labs have been ordered. Skin check–multiple scattered nevi but none abnormal looking at this time. Continue to monitor. Hernial orifices intact.   No cervical, inguinal, axillary lymphadenopath

## 2019-04-22 NOTE — PATIENT INSTRUCTIONS
Problem List Items Addressed This Visit        Unprioritized    Hypertension     Blood pressure 136/87, pulse 50, height 5' 4\" (1.626 m), weight 161 lb (73 kg).     history of hypertension with proteinuria–much improved on current combination of medication

## 2019-04-22 NOTE — PROGRESS NOTES
REASON FOR VISIT:    Abel Baldwin is a 37year old male who presents for an [address]     Written by Trinity Michael MD on 4/11/2019  9:33 PM   Urine microalbumin is normal at 1.57.  This is improved from the last test done a few months ba reminders to display for this patient. Flex Sigmoidoscopy Screen  Every 5 years No results found for this or any previous visit.     Fecal Occult Blood  Annually No results found for: FOB, OCCULTSTOOL    Obesity Screening Screen all adults annually Body flowsheet data found. Dilated Eye exam  Annually No flowsheet data found. No flowsheet data found. Asthma  (Annually between Nov. 1 & Dec. 31)    Date of last AAP/ACT and counseling given on importance of controller meds.                  ALLERGIES: of allergy or asthma    EXAM:   /87 (BP Location: Right arm, Patient Position: Sitting, Cuff Size: adult)   Pulse 50   Ht 5' 4\" (1.626 m)   Wt 161 lb (73 kg)   BMI 27.64 kg/m²   GENERAL: well developed, well nourished, in no apparent distress  SKIN: Continue to monitor. Hernial orifices intact. No cervical, inguinal, axillary lymphadenopathy. Rectal exam completed–brown stools, guaiac negative. No other palpable abnormalities.   Prostate exam looks normal.    Immunization History   Administered Reggie 10/01/2013   • TDAP 12/05/2011       Influenza Annually   Pneumococcal if high risk   Td/Tdap once then every 10 years   HPV Males 11-21   Zoster (Shingles) 60 and older: one dose   Varicella 2 doses if not immune   MMR 1-2 doses if born after 1958 and not

## 2019-04-22 NOTE — ASSESSMENT & PLAN NOTE
Blood pressure 136/87, pulse 50, height 5' 4\" (1.626 m), weight 161 lb (73 kg). history of hypertension with proteinuria–much improved on current combination of medications. Continue on diltiazem 180 mg daily, losartan 100 mg daily.   Advised to rechec

## 2019-04-22 NOTE — ASSESSMENT & PLAN NOTE
This has been well supplemented. Continue on an over-the-counter vitamin D 2000 units daily.   Recheck labs with the next blood draw

## 2019-05-06 RX ORDER — AZELASTINE HYDROCHLORIDE 137 UG/1
SPRAY, METERED NASAL
Qty: 1 BOTTLE | Refills: 0 | Status: SHIPPED | OUTPATIENT
Start: 2019-05-06 | End: 2020-10-19 | Stop reason: ALTCHOICE

## 2019-05-07 DIAGNOSIS — I10 ESSENTIAL HYPERTENSION: ICD-10-CM

## 2019-05-07 RX ORDER — LOSARTAN POTASSIUM 100 MG/1
TABLET ORAL
Qty: 90 TABLET | Refills: 1 | Status: SHIPPED | OUTPATIENT
Start: 2019-05-07 | End: 2020-02-07

## 2019-05-07 RX ORDER — DILTIAZEM HYDROCHLORIDE 180 MG/1
CAPSULE, COATED, EXTENDED RELEASE ORAL
Qty: 90 CAPSULE | Refills: 1 | Status: SHIPPED | OUTPATIENT
Start: 2019-05-07 | End: 2020-02-07

## 2019-05-21 ENCOUNTER — TELEPHONE (OUTPATIENT)
Dept: OTOLARYNGOLOGY | Facility: CLINIC | Age: 44
End: 2019-05-21

## 2019-05-21 RX ORDER — AZELASTINE 1 MG/ML
2 SPRAY, METERED NASAL 2 TIMES DAILY
Qty: 1 BOTTLE | Refills: 0 | Status: SHIPPED | OUTPATIENT
Start: 2019-05-21 | End: 2020-10-19 | Stop reason: ALTCHOICE

## 2019-05-21 NOTE — TELEPHONE ENCOUNTER
You may refill medications. He should return to see me at some point for reevaluation. socially/wine

## 2019-05-21 NOTE — TELEPHONE ENCOUNTER
Dr Edward Pak patient last visit on 4/5/19 throat pain request for refill ,last note patient os due for follow up. please advise.

## 2019-05-21 NOTE — TELEPHONE ENCOUNTER
90 Day supply: Request for authorization    Current Outpatient Medications:  AZELASTINE  MCG/SPRAY Nasal Solution SPRAY 2 SPRAYS INTO EACH NOSTRIL TWICE A DAY Disp: 1 Bottle Rfl: 0

## 2019-06-18 RX ORDER — OMEPRAZOLE 40 MG/1
CAPSULE, DELAYED RELEASE ORAL
Qty: 90 CAPSULE | Refills: 1 | Status: SHIPPED | OUTPATIENT
Start: 2019-06-18 | End: 2020-10-19 | Stop reason: ALTCHOICE

## 2019-06-18 NOTE — TELEPHONE ENCOUNTER
Refill passed per [facility], [facility] protocol.   Refill Protocol Appointment Criteria  · Appointment scheduled in the past 12 months or in the next 3 months  Recent Outpatient Visits            1 month ago Vitamin D deficiency    [facility]

## 2019-07-01 RX ORDER — MONTELUKAST SODIUM 10 MG/1
TABLET ORAL
Qty: 90 TABLET | Refills: 1 | Status: SHIPPED | OUTPATIENT
Start: 2019-07-01 | End: 2020-10-19 | Stop reason: ALTCHOICE

## 2019-09-09 ENCOUNTER — LAB ENCOUNTER (OUTPATIENT)
Dept: LAB | Facility: HOSPITAL | Age: 44
End: 2019-09-09
Attending: INTERNAL MEDICINE
Payer: COMMERCIAL

## 2019-09-09 DIAGNOSIS — E55.9 VITAMIN D DEFICIENCY: ICD-10-CM

## 2019-09-09 DIAGNOSIS — I10 ESSENTIAL HYPERTENSION: ICD-10-CM

## 2019-09-09 DIAGNOSIS — Z00.00 ROUTINE PHYSICAL EXAMINATION: ICD-10-CM

## 2019-09-09 DIAGNOSIS — Z12.5 PROSTATE CANCER SCREENING: ICD-10-CM

## 2019-09-09 LAB
25(OH)D3 SERPL-MCNC: 36.8 NG/ML (ref 30–100)
ALBUMIN SERPL-MCNC: 4.1 G/DL (ref 3.4–5)
ALBUMIN/GLOB SERPL: 1.1 {RATIO} (ref 1–2)
ALP LIVER SERPL-CCNC: 48 U/L (ref 45–117)
ALT SERPL-CCNC: 46 U/L (ref 16–61)
ANION GAP SERPL CALC-SCNC: 5 MMOL/L (ref 0–18)
AST SERPL-CCNC: 18 U/L (ref 15–37)
BASOPHILS # BLD AUTO: 0.02 X10(3) UL (ref 0–0.2)
BASOPHILS NFR BLD AUTO: 0.3 %
BILIRUB SERPL-MCNC: 0.7 MG/DL (ref 0.1–2)
BILIRUB UR QL: NEGATIVE
BUN BLD-MCNC: 12 MG/DL (ref 7–18)
BUN/CREAT SERPL: 12.4 (ref 10–20)
CALCIUM BLD-MCNC: 8.8 MG/DL (ref 8.5–10.1)
CHLORIDE SERPL-SCNC: 105 MMOL/L (ref 98–112)
CHOLEST SMN-MCNC: 173 MG/DL (ref ?–200)
CLARITY UR: CLEAR
CO2 SERPL-SCNC: 30 MMOL/L (ref 21–32)
COLOR UR: YELLOW
COMPLEXED PSA SERPL-MCNC: 2.27 NG/ML (ref ?–4)
CREAT BLD-MCNC: 0.97 MG/DL (ref 0.7–1.3)
CREAT UR-SCNC: 36.7 MG/DL
DEPRECATED RDW RBC AUTO: 41.8 FL (ref 35.1–46.3)
EOSINOPHIL # BLD AUTO: 0.25 X10(3) UL (ref 0–0.7)
EOSINOPHIL NFR BLD AUTO: 3.7 %
ERYTHROCYTE [DISTWIDTH] IN BLOOD BY AUTOMATED COUNT: 13.1 % (ref 11–15)
GLOBULIN PLAS-MCNC: 3.6 G/DL (ref 2.8–4.4)
GLUCOSE BLD-MCNC: 93 MG/DL (ref 70–99)
GLUCOSE UR-MCNC: NEGATIVE MG/DL
HCT VFR BLD AUTO: 48.4 % (ref 39–53)
HDLC SERPL-MCNC: 46 MG/DL (ref 40–59)
HGB BLD-MCNC: 16.7 G/DL (ref 13–17.5)
IMM GRANULOCYTES # BLD AUTO: 0.03 X10(3) UL (ref 0–1)
IMM GRANULOCYTES NFR BLD: 0.4 %
KETONES UR-MCNC: NEGATIVE MG/DL
LDLC SERPL CALC-MCNC: 105 MG/DL (ref ?–100)
LEUKOCYTE ESTERASE UR QL STRIP.AUTO: NEGATIVE
LYMPHOCYTES # BLD AUTO: 2.73 X10(3) UL (ref 1–4)
LYMPHOCYTES NFR BLD AUTO: 40.3 %
M PROTEIN MFR SERPL ELPH: 7.7 G/DL (ref 6.4–8.2)
MCH RBC QN AUTO: 30.1 PG (ref 26–34)
MCHC RBC AUTO-ENTMCNC: 34.5 G/DL (ref 31–37)
MCV RBC AUTO: 87.4 FL (ref 80–100)
MICROALBUMIN UR-MCNC: 1.75 MG/DL
MICROALBUMIN/CREAT 24H UR-RTO: 47.7 UG/MG (ref ?–30)
MONOCYTES # BLD AUTO: 0.4 X10(3) UL (ref 0.1–1)
MONOCYTES NFR BLD AUTO: 5.9 %
NEUTROPHILS # BLD AUTO: 3.34 X10 (3) UL (ref 1.5–7.7)
NEUTROPHILS # BLD AUTO: 3.34 X10(3) UL (ref 1.5–7.7)
NEUTROPHILS NFR BLD AUTO: 49.4 %
NITRITE UR QL STRIP.AUTO: NEGATIVE
NONHDLC SERPL-MCNC: 127 MG/DL (ref ?–130)
OSMOLALITY SERPL CALC.SUM OF ELEC: 289 MOSM/KG (ref 275–295)
PATIENT FASTING: YES
PATIENT FASTING: YES
PH UR: 6 [PH] (ref 5–8)
PLATELET # BLD AUTO: 253 10(3)UL (ref 150–450)
POTASSIUM SERPL-SCNC: 3.9 MMOL/L (ref 3.5–5.1)
PROT UR-MCNC: NEGATIVE MG/DL
RBC # BLD AUTO: 5.54 X10(6)UL (ref 4.3–5.7)
RBC #/AREA URNS AUTO: 0 /HPF
SODIUM SERPL-SCNC: 140 MMOL/L (ref 136–145)
SP GR UR STRIP: 1 (ref 1–1.03)
TRIGL SERPL-MCNC: 108 MG/DL (ref 30–149)
TSI SER-ACNC: 1.12 MIU/ML (ref 0.36–3.74)
UROBILINOGEN UR STRIP-ACNC: <2
VIT C UR-MCNC: NEGATIVE MG/DL
VLDLC SERPL CALC-MCNC: 22 MG/DL (ref 0–30)
WBC # BLD AUTO: 6.8 X10(3) UL (ref 4–11)
WBC #/AREA URNS AUTO: <1 /HPF

## 2019-09-09 PROCEDURE — 82043 UR ALBUMIN QUANTITATIVE: CPT

## 2019-09-09 PROCEDURE — 36415 COLL VENOUS BLD VENIPUNCTURE: CPT

## 2019-09-09 PROCEDURE — 84443 ASSAY THYROID STIM HORMONE: CPT

## 2019-09-09 PROCEDURE — 80053 COMPREHEN METABOLIC PANEL: CPT

## 2019-09-09 PROCEDURE — 82570 ASSAY OF URINE CREATININE: CPT

## 2019-09-09 PROCEDURE — 80061 LIPID PANEL: CPT

## 2019-09-09 PROCEDURE — 81001 URINALYSIS AUTO W/SCOPE: CPT

## 2019-09-09 PROCEDURE — 85025 COMPLETE CBC W/AUTO DIFF WBC: CPT

## 2019-09-09 PROCEDURE — 82306 VITAMIN D 25 HYDROXY: CPT

## 2019-10-04 ENCOUNTER — OFFICE VISIT (OUTPATIENT)
Dept: INTERNAL MEDICINE CLINIC | Facility: CLINIC | Age: 44
End: 2019-10-04

## 2019-10-04 VITALS
TEMPERATURE: 98 F | HEIGHT: 64 IN | WEIGHT: 157 LBS | BODY MASS INDEX: 26.8 KG/M2 | HEART RATE: 53 BPM | SYSTOLIC BLOOD PRESSURE: 134 MMHG | DIASTOLIC BLOOD PRESSURE: 89 MMHG

## 2019-10-04 DIAGNOSIS — I83.11 VARICOSE VEINS OF BOTH LOWER EXTREMITIES WITH INFLAMMATION: Primary | ICD-10-CM

## 2019-10-04 DIAGNOSIS — I83.12 VARICOSE VEINS OF BOTH LOWER EXTREMITIES WITH INFLAMMATION: Primary | ICD-10-CM

## 2019-10-04 DIAGNOSIS — R80.9 PROTEINURIA, UNSPECIFIED TYPE: ICD-10-CM

## 2019-10-04 DIAGNOSIS — I10 ESSENTIAL HYPERTENSION: ICD-10-CM

## 2019-10-04 PROCEDURE — 99214 OFFICE O/P EST MOD 30 MIN: CPT | Performed by: INTERNAL MEDICINE

## 2019-10-04 RX ORDER — BUMETANIDE 0.5 MG/1
0.5 TABLET ORAL DAILY
Qty: 30 TABLET | Refills: 1 | Status: SHIPPED | OUTPATIENT
Start: 2019-10-04 | End: 2019-11-08

## 2019-10-04 NOTE — ASSESSMENT & PLAN NOTE
History of proteinuria which had resolved but has been rising slowly. He does have borderline elevations in the blood pressure. Will monitor at this time no further interventions. Will add Bumex to help with improvement in his blood pressure.   Reassess

## 2019-10-04 NOTE — ASSESSMENT & PLAN NOTE
Bi Lateral lower extremity varicose veins with pain and throbbing off and on. Patient's work involves long periods of standing. Compression stockings as directed. Venous Doppler studies bilateral lower extremities.   Consider referral for varicose vein e

## 2019-10-04 NOTE — PATIENT INSTRUCTIONS
Problem List Items Addressed This Visit        Unprioritized    Hypertension     Blood pressure 134/89, pulse 53, temperature 98.2 °F (36.8 °C), temperature source Oral, height 5' 4\" (1.626 m), weight 157 lb (71.2 kg).   Blood pressures look stable but viki

## 2019-10-04 NOTE — PROGRESS NOTES
HPI:    Patient ID: Hector Zamorano is a 53year old male. Hector Zamorano on 9/19/2019  9:04 AM   Written by Michelle Young MD on 9/15/2019  8:59 PM   The vitamin D levels look great. The blood counts look normal-no anemia.    The kidney functions, l disease include sedentary lifestyle, dyslipidemia and male gender. Past treatments include angiotensin blockers, lifestyle changes and calcium channel blockers. The current treatment provides moderate improvement.  Compliance problems include exercise and d EXAM:   Physical Exam   Constitutional: He is oriented to person, place, and time. He appears well-developed and well-nourished. HENT:   Head: Normocephalic and atraumatic.    Right Ear: External ear normal.   Left Ear: External ear normal.   Nose: Nose n monitor at this time no further interventions. Will add Bumex to help with improvement in his blood pressure. Reassess in the next 3 to 4 months.          Varicose veins of both lower extremities with inflammation - Primary     Bi Lateral lower extremity

## 2019-10-04 NOTE — ASSESSMENT & PLAN NOTE
Blood pressure 134/89, pulse 53, temperature 98.2 °F (36.8 °C), temperature source Oral, height 5' 4\" (1.626 m), weight 157 lb (71.2 kg). Blood pressures look stable but diastolic pressures remain slightly elevated. Heart rate is quite low.   Current med

## 2019-11-07 ENCOUNTER — HOSPITAL ENCOUNTER (OUTPATIENT)
Dept: ULTRASOUND IMAGING | Facility: HOSPITAL | Age: 44
Discharge: HOME | End: 2019-11-07
Attending: INTERNAL MEDICINE
Payer: COMMERCIAL

## 2019-11-07 DIAGNOSIS — I83.12 VARICOSE VEINS OF BOTH LOWER EXTREMITIES WITH INFLAMMATION: ICD-10-CM

## 2019-11-07 DIAGNOSIS — I83.11 VARICOSE VEINS OF BOTH LOWER EXTREMITIES WITH INFLAMMATION: ICD-10-CM

## 2019-11-07 PROCEDURE — 93970 EXTREMITY STUDY: CPT | Performed by: INTERNAL MEDICINE

## 2019-11-09 RX ORDER — BUMETANIDE 0.5 MG/1
TABLET ORAL
Qty: 15 TABLET | Refills: 5 | Status: SHIPPED | OUTPATIENT
Start: 2019-11-09

## 2019-11-09 NOTE — TELEPHONE ENCOUNTER
Dr Marr= please see your note on patient's LOV last month regarding the Bumex, do you want the patient to take it twice a week only or daily (see medication record-SIG)? medication pended for approval.    Problem List Items Addressed This Visit

## 2019-11-09 NOTE — TELEPHONE ENCOUNTER
Blood pressure 134/89, pulse 53, temperature 98.2 °F (36.8 °C), temperature source Oral, height 5' 4\" (1.626 m), weight 157 lb (71.2 kg). Blood pressures look stable but diastolic pressures remain slightly elevated. Heart rate is quite low.   Current me

## 2020-02-06 DIAGNOSIS — I10 ESSENTIAL HYPERTENSION: ICD-10-CM

## 2020-02-07 RX ORDER — LOSARTAN POTASSIUM 100 MG/1
TABLET ORAL
Qty: 90 TABLET | Refills: 1 | Status: SHIPPED | OUTPATIENT
Start: 2020-02-07 | End: 2020-06-12

## 2020-02-07 RX ORDER — DILTIAZEM HYDROCHLORIDE 180 MG/1
CAPSULE, COATED, EXTENDED RELEASE ORAL
Qty: 90 CAPSULE | Refills: 1 | Status: SHIPPED | OUTPATIENT
Start: 2020-02-07 | End: 2020-06-12

## 2020-02-12 ENCOUNTER — TELEPHONE (OUTPATIENT)
Dept: INTERNAL MEDICINE CLINIC | Facility: CLINIC | Age: 45
End: 2020-02-12

## 2020-02-12 NOTE — TELEPHONE ENCOUNTER
Patient is requesting an appointment for a follow up on his blood pressure. Patient states he can not make it tomorrow 02/13/2020 for an appointment and the next available appointment is not until 04/28/2020. Patient is requesting to be sooner than April. Please advise.

## 2020-02-13 ENCOUNTER — APPOINTMENT (OUTPATIENT)
Dept: LAB | Facility: HOSPITAL | Age: 45
End: 2020-02-13
Attending: INTERNAL MEDICINE
Payer: COMMERCIAL

## 2020-02-13 DIAGNOSIS — I10 ESSENTIAL HYPERTENSION: ICD-10-CM

## 2020-02-13 LAB
ALBUMIN SERPL-MCNC: 4.2 G/DL (ref 3.4–5)
ALBUMIN/GLOB SERPL: 1.2 {RATIO} (ref 1–2)
ALP LIVER SERPL-CCNC: 41 U/L (ref 45–117)
ALT SERPL-CCNC: 47 U/L (ref 16–61)
ANION GAP SERPL CALC-SCNC: 5 MMOL/L (ref 0–18)
AST SERPL-CCNC: 26 U/L (ref 15–37)
BACTERIA UR QL AUTO: NEGATIVE /HPF
BILIRUB SERPL-MCNC: 0.7 MG/DL (ref 0.1–2)
BILIRUB UR QL: NEGATIVE
BUN BLD-MCNC: 14 MG/DL (ref 7–18)
BUN/CREAT SERPL: 12.7 (ref 10–20)
CALCIUM BLD-MCNC: 8.8 MG/DL (ref 8.5–10.1)
CHLORIDE SERPL-SCNC: 105 MMOL/L (ref 98–112)
CLARITY UR: CLEAR
CO2 SERPL-SCNC: 30 MMOL/L (ref 21–32)
COLOR UR: YELLOW
CREAT BLD-MCNC: 1.1 MG/DL (ref 0.7–1.3)
CREAT UR-SCNC: 56.4 MG/DL
GLOBULIN PLAS-MCNC: 3.4 G/DL (ref 2.8–4.4)
GLUCOSE BLD-MCNC: 87 MG/DL (ref 70–99)
GLUCOSE UR-MCNC: NEGATIVE MG/DL
KETONES UR-MCNC: NEGATIVE MG/DL
LEUKOCYTE ESTERASE UR QL STRIP.AUTO: NEGATIVE
M PROTEIN MFR SERPL ELPH: 7.6 G/DL (ref 6.4–8.2)
MICROALBUMIN UR-MCNC: 1.83 MG/DL
MICROALBUMIN/CREAT 24H UR-RTO: 32.4 UG/MG (ref ?–30)
NITRITE UR QL STRIP.AUTO: NEGATIVE
OSMOLALITY SERPL CALC.SUM OF ELEC: 290 MOSM/KG (ref 275–295)
PATIENT FASTING Y/N/NP: YES
PH UR: 6 [PH] (ref 5–8)
POTASSIUM SERPL-SCNC: 3.6 MMOL/L (ref 3.5–5.1)
PROT UR-MCNC: NEGATIVE MG/DL
RBC #/AREA URNS AUTO: 0 /HPF
SODIUM SERPL-SCNC: 140 MMOL/L (ref 136–145)
SP GR UR STRIP: 1.01 (ref 1–1.03)
UROBILINOGEN UR STRIP-ACNC: <2
WBC #/AREA URNS AUTO: <1 /HPF

## 2020-02-13 PROCEDURE — 81001 URINALYSIS AUTO W/SCOPE: CPT

## 2020-02-13 PROCEDURE — 82043 UR ALBUMIN QUANTITATIVE: CPT

## 2020-02-13 PROCEDURE — 80053 COMPREHEN METABOLIC PANEL: CPT

## 2020-02-13 PROCEDURE — 82570 ASSAY OF URINE CREATININE: CPT

## 2020-02-13 PROCEDURE — 36415 COLL VENOUS BLD VENIPUNCTURE: CPT

## 2020-02-13 RX ORDER — LOSARTAN POTASSIUM 100 MG/1
100 TABLET ORAL
Qty: 90 TABLET | Refills: 1 | Status: CANCELLED
Start: 2020-02-13

## 2020-06-12 ENCOUNTER — OFFICE VISIT (OUTPATIENT)
Dept: INTERNAL MEDICINE CLINIC | Facility: CLINIC | Age: 45
End: 2020-06-12

## 2020-06-12 VITALS
HEART RATE: 62 BPM | WEIGHT: 158 LBS | DIASTOLIC BLOOD PRESSURE: 87 MMHG | HEIGHT: 64 IN | BODY MASS INDEX: 26.98 KG/M2 | SYSTOLIC BLOOD PRESSURE: 128 MMHG

## 2020-06-12 DIAGNOSIS — Z00.00 ROUTINE PHYSICAL EXAMINATION: ICD-10-CM

## 2020-06-12 DIAGNOSIS — I83.12 VARICOSE VEINS OF BOTH LOWER EXTREMITIES WITH INFLAMMATION: ICD-10-CM

## 2020-06-12 DIAGNOSIS — Z12.5 PROSTATE CANCER SCREENING: ICD-10-CM

## 2020-06-12 DIAGNOSIS — E55.9 VITAMIN D DEFICIENCY: Primary | ICD-10-CM

## 2020-06-12 DIAGNOSIS — I10 ESSENTIAL HYPERTENSION: ICD-10-CM

## 2020-06-12 DIAGNOSIS — I83.11 VARICOSE VEINS OF BOTH LOWER EXTREMITIES WITH INFLAMMATION: ICD-10-CM

## 2020-06-12 PROCEDURE — 99396 PREV VISIT EST AGE 40-64: CPT | Performed by: INTERNAL MEDICINE

## 2020-06-12 RX ORDER — DILTIAZEM HYDROCHLORIDE 180 MG/1
180 CAPSULE, COATED, EXTENDED RELEASE ORAL DAILY
Qty: 90 CAPSULE | Refills: 1 | Status: SHIPPED | OUTPATIENT
Start: 2020-06-12 | End: 2020-09-27

## 2020-06-12 RX ORDER — LOSARTAN POTASSIUM 100 MG/1
100 TABLET ORAL DAILY
Qty: 90 TABLET | Refills: 1 | Status: SHIPPED | OUTPATIENT
Start: 2020-06-12 | End: 2021-03-14

## 2020-06-12 NOTE — PATIENT INSTRUCTIONS
Problem List Items Addressed This Visit        Unprioritized    Hypertension     Blood pressure 128/87, pulse 62, height 5' 4\" (1.626 m), weight 158 lb (71.7 kg). Stable blood pressure, diastolic blood pressures continue to remain elevated.   Continue on

## 2020-06-12 NOTE — ASSESSMENT & PLAN NOTE
Blood pressure 128/87, pulse 62, height 5' 4\" (1.626 m), weight 158 lb (71.7 kg). Stable blood pressure, diastolic blood pressures continue to remain elevated.   Continue on losartan 100 mg daily, diltiazem 180 mg daily, Bumex 0.5 mg 2 times a week on Mon

## 2020-06-12 NOTE — ASSESSMENT & PLAN NOTE
Venous incompetence noted with the varicosities bilateral lower extremities. Referral for evaluation per interventional radiology provided.

## 2020-06-12 NOTE — PROGRESS NOTES
REASON FOR VISIT:    Gonzales Godwin is a 50year old male who presents for an [address]     Immunization History   Administered Date(s) Administered   • Influenza 10/01/2015, 10/01/2016, 10/01/2017, 10/16/2018   • Influenza Vaccine, Naye Yan time for adults born 1947-1 26 No results found for: HCVAB    Tuberculosis Screen If high risk No components found for: PPDINDURAT      Disease Monitoring:    SPECIFIC DISEASE MONITORING Internal Lab or Procedure External Lab or Procedure   Annual Monitor times daily. 1 Bottle 0   • AZELASTINE  MCG/SPRAY Nasal Solution SPRAY 2 SPRAYS INTO EACH NOSTRIL TWICE A DAY 1 Bottle 0   • loratadine 10 MG Oral Tab Take 1 tablet (10 mg total) by mouth daily.  30 tablet 3      MEDICAL INFORMATION:   Past Medical H testes, no masses, no hernia, no penile lesions  RECTAL: good rectal tone, prostate shows no masses, stool is OB negative  MUSCULOSKELETAL: back is not tender, FROM of the back  EXTREMITIES: no cyanosis, clubbing or edema  NEURO: Oriented times three, cran the varicosities bilateral lower extremities. Referral for evaluation per interventional radiology provided.              Relevant Orders    OP REFERRAL TO INTERVENTIONAL RADIOLOGY    Vitamin D deficiency - Primary      Other Visit Diagnoses     Prostate c

## 2020-06-12 NOTE — ASSESSMENT & PLAN NOTE
Normal exam.  Labs as ordered. Skin check–normal  No cervical, axillary, inguinal lymphadenopathy. Hernial orifices intact. Rectal exam normal,prostate normal to palpation. Small hemorrhoids present.  guaic negetive brown stools.   PSA completed in Sept

## 2020-06-16 ENCOUNTER — TELEPHONE (OUTPATIENT)
Dept: INTERVENTIONAL RADIOLOGY/VASCULAR | Facility: HOSPITAL | Age: 45
End: 2020-06-16

## 2020-06-16 NOTE — TELEPHONE ENCOUNTER
Attempted to call patient for consult for varicose veins. Left message for patient to call our office.

## 2020-07-21 ENCOUNTER — TELEPHONE (OUTPATIENT)
Dept: INTERNAL MEDICINE CLINIC | Facility: CLINIC | Age: 45
End: 2020-07-21

## 2020-07-21 NOTE — TELEPHONE ENCOUNTER
Patient states Dr. Bill Delarosa gave him a referral to see Dr. Андрей Thornton for his varicose veins. Patient states he received a call from [employer] asking him for a prior authorization letter to see Dr. Андрей Thornton. [employer] call back number is [phone] patient sates she is there from 7 am to 3:30 pm. Patient would like to know if she can be provided with this prior authorization letter. Please advise.

## 2020-07-22 NOTE — TELEPHONE ENCOUNTER
Referral was approved in June. Spoke with Tano/Dr. Scotty Martínez office, explain that although doctor is part of [facility] he is with patient [facility] network and no additional letters are needed. Per Anahi Rousseau, office is able to see referral in Epic and no additional action is needed. Left Vm for patient to contact me directly for clarification. Thank you, Alden Dallas Specialist    Managed Care.

## 2020-07-24 ENCOUNTER — NURSE TRIAGE (OUTPATIENT)
Dept: INTERNAL MEDICINE CLINIC | Facility: CLINIC | Age: 45
End: 2020-07-24

## 2020-07-24 NOTE — TELEPHONE ENCOUNTER
Action Requested: Summary for Provider     []  Critical Lab, Recommendations Needed  [x] Need Additional Advice  []   FYI    []   Need Orders  [] Need Medications Sent to Pharmacy  []  Other     SUMMARY: pt asking for Covid-19 testing.  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

## 2020-09-26 DIAGNOSIS — I10 ESSENTIAL HYPERTENSION: ICD-10-CM

## 2020-09-27 RX ORDER — DILTIAZEM HYDROCHLORIDE 180 MG/1
CAPSULE, COATED, EXTENDED RELEASE ORAL
Qty: 90 CAPSULE | Refills: 1 | Status: SHIPPED | OUTPATIENT
Start: 2020-09-27 | End: 2021-04-05

## 2020-10-18 ENCOUNTER — APPOINTMENT (OUTPATIENT)
Dept: LAB | Facility: HOSPITAL | Age: 45
End: 2020-10-18
Attending: RADIOLOGY
Payer: COMMERCIAL

## 2020-10-18 DIAGNOSIS — Z01.818 PRE-OP TESTING: ICD-10-CM

## 2020-10-21 ENCOUNTER — HOSPITAL ENCOUNTER (OUTPATIENT)
Dept: INTERVENTIONAL RADIOLOGY/VASCULAR | Facility: HOSPITAL | Age: 45
Discharge: HOME | End: 2020-10-21
Attending: RADIOLOGY | Admitting: RADIOLOGY
Payer: COMMERCIAL

## 2020-10-21 VITALS
HEIGHT: 64 IN | HEART RATE: 54 BPM | BODY MASS INDEX: 26.12 KG/M2 | OXYGEN SATURATION: 100 % | RESPIRATION RATE: 14 BRPM | WEIGHT: 153 LBS | DIASTOLIC BLOOD PRESSURE: 96 MMHG | SYSTOLIC BLOOD PRESSURE: 123 MMHG | TEMPERATURE: 98 F

## 2020-10-21 DIAGNOSIS — I87.2 VENOUS INSUFFICIENCY OF BOTH LOWER EXTREMITIES: ICD-10-CM

## 2020-10-21 DIAGNOSIS — Z01.818 PRE-OP TESTING: Primary | ICD-10-CM

## 2020-10-21 PROCEDURE — 37799 UNLISTED PX VASCULAR SURGERY: CPT

## 2020-10-21 PROCEDURE — 06DP3ZZ EXTRACTION OF RIGHT SAPHENOUS VEIN, PERCUTANEOUS APPROACH: ICD-10-PCS | Performed by: RADIOLOGY

## 2020-10-21 PROCEDURE — 3E033TZ INTRODUCTION OF DESTRUCTIVE AGENT INTO PERIPHERAL VEIN, PERCUTANEOUS APPROACH: ICD-10-PCS | Performed by: RADIOLOGY

## 2020-10-21 PROCEDURE — 99152 MOD SED SAME PHYS/QHP 5/>YRS: CPT

## 2020-10-21 PROCEDURE — 36482 ENDOVEN THER CHEM ADHES 1ST: CPT

## 2020-10-21 PROCEDURE — B54BZZA ULTRASONOGRAPHY OF RIGHT LOWER EXTREMITY VEINS, GUIDANCE: ICD-10-PCS | Performed by: RADIOLOGY

## 2020-10-21 PROCEDURE — 36415 COLL VENOUS BLD VENIPUNCTURE: CPT

## 2020-10-21 PROCEDURE — 99153 MOD SED SAME PHYS/QHP EA: CPT

## 2020-10-21 RX ORDER — SODIUM CHLORIDE 9 MG/ML
INJECTION, SOLUTION INTRAVENOUS CONTINUOUS
Status: DISCONTINUED | OUTPATIENT
Start: 2020-10-21 | End: 2020-10-21

## 2020-10-21 RX ORDER — MIDAZOLAM HYDROCHLORIDE 1 MG/ML
INJECTION INTRAMUSCULAR; INTRAVENOUS
Status: COMPLETED
Start: 2020-10-21 | End: 2020-10-21

## 2020-10-21 RX ORDER — LIDOCAINE HYDROCHLORIDE 20 MG/ML
INJECTION, SOLUTION EPIDURAL; INFILTRATION; INTRACAUDAL; PERINEURAL
Status: COMPLETED
Start: 2020-10-21 | End: 2020-10-21

## 2020-10-21 RX ADMIN — SODIUM CHLORIDE: 9 INJECTION, SOLUTION INTRAVENOUS at 12:45:00

## 2020-10-21 NOTE — H&P
[facility]   History & Physical    Marielos Rolle Patient Status:  Outpatient in a Bed    1975 MRN [id number]   Location [facility] Attending Tiffani Carpenter MD   Hosp Day # 0 PCP Adelaida Mcgarry MD changes. Recommendations: Right great saphenous vein nonthermal ablation with phlebectomy.     Anderson Crockett MD  10/21/2020  1:28 PM

## 2020-10-21 NOTE — PRE-SEDATION ASSESSMENT
[facility]  IR Pre-Procedure Sedation Assessment    History of snoring or sleep or apnea?    No    History of previous problems with anesthesia or sedation  No    Physical Findings:  Neck: nl ROM  CV: RRR  PULM: normal respiratory rate/effor

## 2020-10-21 NOTE — PROGRESS NOTES
Discharge instructions reviewed with patient and spouse at this time. All questions and concerns addressed. All parties verbalized understanding of plan of care via teach back method.  At discharge vital signs were stable on room air, incision dressing was

## 2020-10-21 NOTE — PROCEDURES
[facility] HOSP - [facility]  Procedure Note    Bryanna Hill Patient Status:  Outpatient in a Bed    1975 MRN [id number]   Location [facility] Attending Brenna Lewis MD   Hosp Day # 0 PCP Mao Matos MD     Pr

## 2020-10-28 ENCOUNTER — HOSPITAL ENCOUNTER (OUTPATIENT)
Dept: ULTRASOUND IMAGING | Facility: HOSPITAL | Age: 45
Discharge: HOME | End: 2020-10-28
Attending: RADIOLOGY
Payer: COMMERCIAL

## 2020-10-28 DIAGNOSIS — M79.604 RIGHT LEG PAIN: ICD-10-CM

## 2020-10-28 DIAGNOSIS — M79.89 RIGHT LEG SWELLING: ICD-10-CM

## 2020-10-28 PROCEDURE — 93971 EXTREMITY STUDY: CPT | Performed by: RADIOLOGY

## 2020-12-09 ENCOUNTER — LAB ENCOUNTER (OUTPATIENT)
Dept: LAB | Facility: HOSPITAL | Age: 45
End: 2020-12-09
Attending: INTERNAL MEDICINE
Payer: COMMERCIAL

## 2020-12-09 DIAGNOSIS — Z12.5 PROSTATE CANCER SCREENING: ICD-10-CM

## 2020-12-09 DIAGNOSIS — Z00.00 ROUTINE PHYSICAL EXAMINATION: ICD-10-CM

## 2020-12-09 PROCEDURE — 81001 URINALYSIS AUTO W/SCOPE: CPT

## 2020-12-09 PROCEDURE — 80061 LIPID PANEL: CPT

## 2020-12-09 PROCEDURE — 36415 COLL VENOUS BLD VENIPUNCTURE: CPT

## 2020-12-09 PROCEDURE — 85025 COMPLETE CBC W/AUTO DIFF WBC: CPT

## 2020-12-09 PROCEDURE — 84443 ASSAY THYROID STIM HORMONE: CPT

## 2020-12-09 PROCEDURE — 82306 VITAMIN D 25 HYDROXY: CPT

## 2020-12-09 PROCEDURE — 82607 VITAMIN B-12: CPT

## 2020-12-09 PROCEDURE — 80053 COMPREHEN METABOLIC PANEL: CPT

## 2020-12-18 ENCOUNTER — HOSPITAL ENCOUNTER (OUTPATIENT)
Dept: INTERVENTIONAL RADIOLOGY/VASCULAR | Facility: HOSPITAL | Age: 45
Discharge: HOME | End: 2020-12-18
Attending: RADIOLOGY
Payer: COMMERCIAL

## 2020-12-18 DIAGNOSIS — Z01.818 PREOP TESTING: Primary | ICD-10-CM

## 2021-01-04 ENCOUNTER — LAB ENCOUNTER (OUTPATIENT)
Dept: LAB | Facility: HOSPITAL | Age: 46
End: 2021-01-04
Attending: RADIOLOGY
Payer: COMMERCIAL

## 2021-01-04 DIAGNOSIS — Z01.818 PREOP TESTING: ICD-10-CM

## 2021-01-04 LAB — SARS-COV-2 RNA RESP QL NAA+PROBE: NOT DETECTED

## 2021-01-06 ENCOUNTER — HOSPITAL ENCOUNTER (OUTPATIENT)
Dept: INTERVENTIONAL RADIOLOGY/VASCULAR | Facility: HOSPITAL | Age: 46
Discharge: HOME | End: 2021-01-06
Attending: RADIOLOGY | Admitting: RADIOLOGY
Payer: COMMERCIAL

## 2021-01-06 VITALS
RESPIRATION RATE: 18 BRPM | DIASTOLIC BLOOD PRESSURE: 98 MMHG | SYSTOLIC BLOOD PRESSURE: 140 MMHG | HEIGHT: 64 IN | WEIGHT: 155 LBS | TEMPERATURE: 98 F | OXYGEN SATURATION: 97 % | HEART RATE: 58 BPM | BODY MASS INDEX: 26.46 KG/M2

## 2021-01-06 DIAGNOSIS — I87.2 VENOUS INSUFFICIENCY OF BOTH LOWER EXTREMITIES: ICD-10-CM

## 2021-01-06 PROCEDURE — 37799 UNLISTED PX VASCULAR SURGERY: CPT

## 2021-01-06 PROCEDURE — 99152 MOD SED SAME PHYS/QHP 5/>YRS: CPT

## 2021-01-06 PROCEDURE — 36482 ENDOVEN THER CHEM ADHES 1ST: CPT

## 2021-01-06 PROCEDURE — 3E033TZ INTRODUCTION OF DESTRUCTIVE AGENT INTO PERIPHERAL VEIN, PERCUTANEOUS APPROACH: ICD-10-PCS | Performed by: RADIOLOGY

## 2021-01-06 PROCEDURE — 99153 MOD SED SAME PHYS/QHP EA: CPT

## 2021-01-06 PROCEDURE — 06DQ3ZZ EXTRACTION OF LEFT SAPHENOUS VEIN, PERCUTANEOUS APPROACH: ICD-10-PCS | Performed by: RADIOLOGY

## 2021-01-06 RX ORDER — SODIUM TETRADECYL SULFATE 30 MG/ML
INJECTION, SOLUTION INTRAVENOUS
Status: COMPLETED
Start: 2021-01-06 | End: 2021-01-06

## 2021-01-06 RX ORDER — SODIUM CHLORIDE 9 MG/ML
INJECTION, SOLUTION INTRAVENOUS CONTINUOUS
Status: DISCONTINUED | OUTPATIENT
Start: 2021-01-06 | End: 2021-01-06

## 2021-01-06 RX ORDER — LIDOCAINE HYDROCHLORIDE 20 MG/ML
INJECTION, SOLUTION EPIDURAL; INFILTRATION; INTRACAUDAL; PERINEURAL
Status: COMPLETED
Start: 2021-01-06 | End: 2021-01-06

## 2021-01-06 RX ORDER — MIDAZOLAM HYDROCHLORIDE 1 MG/ML
INJECTION INTRAMUSCULAR; INTRAVENOUS
Status: COMPLETED
Start: 2021-01-06 | End: 2021-01-06

## 2021-01-06 RX ADMIN — SODIUM CHLORIDE: 9 INJECTION, SOLUTION INTRAVENOUS at 12:45:00

## 2021-01-06 NOTE — H&P
[facility] HOSP - [facility]   History & Physical    Froylan Smith Patient Status:  Outpatient in a Bed    1975 MRN [id number]   Location [place] Attending Holly Song MD   Hosp Day # 0 PCP Dajuan Gilman MD

## 2021-01-06 NOTE — PROCEDURES
[facility]  Procedure Note    Shreyas Both Patient Status:  Outpatient in a Bed    1975 MRN [id number]   Location [facility] Attending Rajani Fontanez MD   Hosp Day # 0 PCP Samara Vogel MD     Pr

## 2021-01-06 NOTE — PRE-SEDATION ASSESSMENT
[facility]  IR Pre-Procedure Sedation Assessment    History of snoring or sleep or apnea?    No    History of previous problems with anesthesia or sedation  No    Physical Findings:  Neck: nl ROM  CV: RRR  PULM: normal respiratory rate/effor

## 2021-01-06 NOTE — IVS NOTE
Patient is able to sit up and ambulate without difficulty. Procedure access site is dry and intact with good motion, circulation and sensation. No signs and symptoms of bleeding or hematoma. Patient is able to tolerate food and fluids.   Vital signs samra

## 2021-01-13 ENCOUNTER — HOSPITAL ENCOUNTER (OUTPATIENT)
Dept: ULTRASOUND IMAGING | Facility: HOSPITAL | Age: 46
Discharge: HOME | End: 2021-01-13
Attending: RADIOLOGY
Payer: COMMERCIAL

## 2021-01-13 DIAGNOSIS — M79.89 LEG SWELLING: ICD-10-CM

## 2021-01-13 PROCEDURE — 93971 EXTREMITY STUDY: CPT | Performed by: RADIOLOGY

## 2021-01-13 RX ORDER — LIDOCAINE HYDROCHLORIDE 20 MG/ML
INJECTION, SOLUTION EPIDURAL; INFILTRATION; INTRACAUDAL; PERINEURAL
Status: COMPLETED
Start: 2021-01-13 | End: 2021-01-13

## 2021-01-13 RX ORDER — SODIUM TETRADECYL SULFATE 30 MG/ML
INJECTION, SOLUTION INTRAVENOUS
Status: COMPLETED
Start: 2021-01-13 | End: 2021-01-13

## 2021-01-21 ENCOUNTER — OFFICE VISIT (OUTPATIENT)
Dept: INTERNAL MEDICINE CLINIC | Facility: CLINIC | Age: 46
End: 2021-01-21

## 2021-01-21 VITALS
SYSTOLIC BLOOD PRESSURE: 133 MMHG | TEMPERATURE: 98 F | RESPIRATION RATE: 20 BRPM | WEIGHT: 166 LBS | HEART RATE: 62 BPM | DIASTOLIC BLOOD PRESSURE: 89 MMHG | HEIGHT: 64 IN | BODY MASS INDEX: 28.34 KG/M2

## 2021-01-21 DIAGNOSIS — I83.11 VARICOSE VEINS OF BOTH LOWER EXTREMITIES WITH INFLAMMATION: ICD-10-CM

## 2021-01-21 DIAGNOSIS — K29.00 ACUTE SUPERFICIAL GASTRITIS WITHOUT HEMORRHAGE: ICD-10-CM

## 2021-01-21 DIAGNOSIS — I10 ESSENTIAL HYPERTENSION: Primary | ICD-10-CM

## 2021-01-21 DIAGNOSIS — E55.9 VITAMIN D DEFICIENCY: ICD-10-CM

## 2021-01-21 DIAGNOSIS — I83.12 VARICOSE VEINS OF BOTH LOWER EXTREMITIES WITH INFLAMMATION: ICD-10-CM

## 2021-01-21 PROCEDURE — 3075F SYST BP GE 130 - 139MM HG: CPT | Performed by: INTERNAL MEDICINE

## 2021-01-21 PROCEDURE — 3008F BODY MASS INDEX DOCD: CPT | Performed by: INTERNAL MEDICINE

## 2021-01-21 PROCEDURE — 3079F DIAST BP 80-89 MM HG: CPT | Performed by: INTERNAL MEDICINE

## 2021-01-21 PROCEDURE — 99214 OFFICE O/P EST MOD 30 MIN: CPT | Performed by: INTERNAL MEDICINE

## 2021-01-21 RX ORDER — MULTIVIT-MIN/IRON/FOLIC ACID/K 18-600-40
1 CAPSULE ORAL DAILY
COMMUNITY

## 2021-01-21 RX ORDER — OMEPRAZOLE 40 MG/1
40 CAPSULE, DELAYED RELEASE ORAL DAILY
Qty: 90 CAPSULE | Refills: 3 | Status: SHIPPED | OUTPATIENT
Start: 2021-01-21 | End: 2022-01-16

## 2021-01-21 NOTE — ASSESSMENT & PLAN NOTE
Blood pressure 133/89, pulse 62, temperature 97.6 °F (36.4 °C), temperature source Tympanic, resp. rate 20, height 5' 4\" (1.626 m), weight 166 lb (75.3 kg). Blood pressures look stable but diastolic pressures seem slightly elevated.   Recent labs looke

## 2021-01-21 NOTE — ASSESSMENT & PLAN NOTE
Recurrent episodes of gastritis with epigastric pain, discomfort and reflux. He was treated for a short while with omeprazole but has had recurrence and has been taking Tums quite a bit at this time. He is advised to discontinue his Tums.   Check stool fo

## 2021-01-21 NOTE — ASSESSMENT & PLAN NOTE
Patient is status post intervention for varicosities bilateral lower extremities. He had a venous seal procedure which he tolerated well. He does have some areas of thrombophlebitis but without warmth, tenderness noted.   He is advised to watch these area

## 2021-01-21 NOTE — PROGRESS NOTES
HPI:    Patient ID: Froylan Smith is a 50year old male. Written by Dianna Eng MD on 12/10/2020  6:41 AM  Vitamin D levels look normal.  Vitamin B12 levels look normal.   Blood counts look normal-no anemia.    The kidney functions, liver functions is a recurrent problem. The current episode started more than 1 month ago. The problem occurs frequently. The problem has been waxing and waning (Symptoms as above, takes Tums without improvement. No blood in stools, black stools. ).  The heartburn is locat & Supports (T.E.D.  THIGH LENGTH/M-REGULAR) Does not apply Misc 2 pairs,as directed 1 each 1   • DILTIAZEM HCL ER COATED BEADS 180 MG Oral Capsule SR 24 Hr TAKE 1 CAPSULE BY MOUTH EVERY DAY 90 capsule 1   • LOSARTAN 100 MG Oral Tab TAKE 1 TABLET BY MOUTH EV 133/89, pulse 62, temperature 97.6 °F (36.4 °C), temperature source Tympanic, resp. rate 20, height 5' 4\" (1.626 m), weight 166 lb (75.3 kg). Blood pressures look stable but diastolic pressures seem slightly elevated.   Recent labs looked normal, no si Prescriptions     Signed Prescriptions Disp Refills   • Omeprazole 40 MG Oral Capsule Delayed Release 90 capsule 3     Sig: Take 1 capsule (40 mg total) by mouth daily.        Imaging & Referrals:  None       UX#[id number]

## 2021-01-21 NOTE — PATIENT INSTRUCTIONS
Problem List Items Addressed This Visit        Unprioritized    Acute superficial gastritis without hemorrhage     Recurrent episodes of gastritis with epigastric pain, discomfort and reflux.   He was treated for a short while with omeprazole but has had re

## 2021-01-27 ENCOUNTER — LAB ENCOUNTER (OUTPATIENT)
Dept: LAB | Facility: HOSPITAL | Age: 46
End: 2021-01-27
Attending: INTERNAL MEDICINE
Payer: COMMERCIAL

## 2021-01-27 DIAGNOSIS — K29.00 ACUTE SUPERFICIAL GASTRITIS WITHOUT HEMORRHAGE: ICD-10-CM

## 2021-01-27 PROCEDURE — 87338 HPYLORI STOOL AG IA: CPT

## 2021-01-28 LAB — H PYLORI AG STL QL IA: NEGATIVE

## 2021-03-12 DIAGNOSIS — I10 ESSENTIAL HYPERTENSION: ICD-10-CM

## 2021-03-14 RX ORDER — LOSARTAN POTASSIUM 100 MG/1
TABLET ORAL
Qty: 90 TABLET | Refills: 1 | Status: SHIPPED | OUTPATIENT
Start: 2021-03-14 | End: 2021-09-18

## 2021-04-04 DIAGNOSIS — I10 ESSENTIAL HYPERTENSION: ICD-10-CM

## 2021-04-05 RX ORDER — DILTIAZEM HYDROCHLORIDE 180 MG/1
CAPSULE, COATED, EXTENDED RELEASE ORAL
Qty: 90 CAPSULE | Refills: 1 | Status: SHIPPED | OUTPATIENT
Start: 2021-04-05 | End: 2021-09-08

## 2021-08-23 ENCOUNTER — HOSPITAL ENCOUNTER (OUTPATIENT)
Dept: GENERAL RADIOLOGY | Facility: HOSPITAL | Age: 46
Discharge: HOME | End: 2021-08-23
Attending: INTERNAL MEDICINE
Payer: COMMERCIAL

## 2021-08-23 ENCOUNTER — OFFICE VISIT (OUTPATIENT)
Dept: INTERNAL MEDICINE CLINIC | Facility: CLINIC | Age: 46
End: 2021-08-23

## 2021-08-23 ENCOUNTER — NURSE TRIAGE (OUTPATIENT)
Dept: INTERNAL MEDICINE CLINIC | Facility: CLINIC | Age: 46
End: 2021-08-23

## 2021-08-23 VITALS
WEIGHT: 165 LBS | HEIGHT: 64 IN | BODY MASS INDEX: 28.17 KG/M2 | SYSTOLIC BLOOD PRESSURE: 137 MMHG | DIASTOLIC BLOOD PRESSURE: 90 MMHG | HEART RATE: 60 BPM

## 2021-08-23 DIAGNOSIS — M54.2 NECK PAIN ON LEFT SIDE: Primary | ICD-10-CM

## 2021-08-23 DIAGNOSIS — M54.2 NECK PAIN ON LEFT SIDE: ICD-10-CM

## 2021-08-23 PROCEDURE — 99213 OFFICE O/P EST LOW 20 MIN: CPT | Performed by: INTERNAL MEDICINE

## 2021-08-23 PROCEDURE — 3008F BODY MASS INDEX DOCD: CPT | Performed by: INTERNAL MEDICINE

## 2021-08-23 PROCEDURE — 72040 X-RAY EXAM NECK SPINE 2-3 VW: CPT | Performed by: INTERNAL MEDICINE

## 2021-08-23 PROCEDURE — 3075F SYST BP GE 130 - 139MM HG: CPT | Performed by: INTERNAL MEDICINE

## 2021-08-23 PROCEDURE — 3080F DIAST BP >= 90 MM HG: CPT | Performed by: INTERNAL MEDICINE

## 2021-08-23 RX ORDER — CYCLOBENZAPRINE HCL 10 MG
5 TABLET ORAL 3 TIMES DAILY PRN
Qty: 20 TABLET | Refills: 0 | Status: SHIPPED | OUTPATIENT
Start: 2021-08-23 | End: 2021-09-12

## 2021-08-23 NOTE — PROGRESS NOTES
HPI/Subjective:   Patient ID: Marbin Light is a 48year old male. Patient presents with:  Neck Pain: C/o pain in the left shoulder that moves up to his neck since Saturday. Recalls no injuries. Has tried tylenol with no relief.       HPI patient state Release Take 1 capsule (40 mg total) by mouth daily. 90 capsule 3   • bumetanide 0.5 MG Oral Tab 1 tab po 2 times a week (Patient taking differently: 1 tab po 3 times a week ) 15 tablet 5   • Elastic Bandages & Supports (T.E.D.  THIGH LENGTH/M-REGULAR) Does GERD  Recommend patient to complete the x-ray if the pain is persistent or worsening also can see the physical therapy Dr. Jv Kaur  For the same  Patient can be off from work to 3 days if needed patient states he is his location right now for 2 days we will

## 2021-09-08 DIAGNOSIS — I10 ESSENTIAL HYPERTENSION: ICD-10-CM

## 2021-09-08 RX ORDER — DILTIAZEM HYDROCHLORIDE 180 MG/1
180 CAPSULE, COATED, EXTENDED RELEASE ORAL DAILY
Qty: 90 CAPSULE | Refills: 1 | Status: SHIPPED | OUTPATIENT
Start: 2021-09-08 | End: 2022-01-07

## 2021-09-08 NOTE — TELEPHONE ENCOUNTER
Refill passed per [facility] protocol.     Requested Prescriptions   Pending Prescriptions Disp Refills    DILTIAZEM HCL ER COATED BEADS 180 MG Oral Capsule SR 24 Hr [Pharmacy Med Name: DILTIAZEM 24H ER(CD) 180 MG CP] 90 capsule 1     Sig: TAKE 1 CAPSU

## 2021-09-17 DIAGNOSIS — I10 ESSENTIAL HYPERTENSION: ICD-10-CM

## 2021-09-18 RX ORDER — LOSARTAN POTASSIUM 100 MG/1
TABLET ORAL
Qty: 90 TABLET | Refills: 1 | Status: SHIPPED | OUTPATIENT
Start: 2021-09-18

## 2021-10-28 ENCOUNTER — OFFICE VISIT (OUTPATIENT)
Dept: INTERNAL MEDICINE CLINIC | Facility: CLINIC | Age: 46
End: 2021-10-28

## 2021-10-28 VITALS
DIASTOLIC BLOOD PRESSURE: 80 MMHG | HEART RATE: 68 BPM | HEIGHT: 64 IN | WEIGHT: 160 LBS | SYSTOLIC BLOOD PRESSURE: 132 MMHG | TEMPERATURE: 97 F | RESPIRATION RATE: 16 BRPM | BODY MASS INDEX: 27.31 KG/M2

## 2021-10-28 DIAGNOSIS — Z12.11 COLON CANCER SCREENING: ICD-10-CM

## 2021-10-28 DIAGNOSIS — E55.9 VITAMIN D DEFICIENCY: ICD-10-CM

## 2021-10-28 DIAGNOSIS — Z00.00 ROUTINE PHYSICAL EXAMINATION: Primary | ICD-10-CM

## 2021-10-28 DIAGNOSIS — I10 PRIMARY HYPERTENSION: ICD-10-CM

## 2021-10-28 DIAGNOSIS — Z12.5 PROSTATE CANCER SCREENING: ICD-10-CM

## 2021-10-28 PROCEDURE — 99396 PREV VISIT EST AGE 40-64: CPT | Performed by: INTERNAL MEDICINE

## 2021-10-28 PROCEDURE — 3079F DIAST BP 80-89 MM HG: CPT | Performed by: INTERNAL MEDICINE

## 2021-10-28 PROCEDURE — 3075F SYST BP GE 130 - 139MM HG: CPT | Performed by: INTERNAL MEDICINE

## 2021-10-28 PROCEDURE — 3008F BODY MASS INDEX DOCD: CPT | Performed by: INTERNAL MEDICINE

## 2021-10-28 NOTE — PATIENT INSTRUCTIONS
Problem List Items Addressed This Visit        Unprioritized    Hypertension     Blood pressure 132/80, pulse 68, temperature 97.4 °F (36.3 °C), temperature source Temporal, resp. rate 16, height 5' 4\" (1.626 m), weight 160 lb (72.6 kg).      Blood pressur 25-HYDROXY      Other Visit Diagnoses     Prostate cancer screening        Relevant Orders    PSA SCREEN    Colon cancer screening        Relevant Orders    GASTRO - INTERNAL

## 2021-10-28 NOTE — ASSESSMENT & PLAN NOTE
This has been well supplemented. Continue on over-the-counter vitamin D 2000 units daily and recheck labs as directed.

## 2021-10-28 NOTE — ASSESSMENT & PLAN NOTE
Normal exam.  Labs as ordered. Skin check–normal  No cervical, axillary, inguinal lymphadenopathy. Hernial orifices intact. Rectal exam normal,prostate normal to palpation. PSA levels normal in December 2020. Follow-up labs as ordered.     Small hemorr

## 2021-10-28 NOTE — PROGRESS NOTES
HPI:   Marielos Rolle is a 48year old male who presents for an Annual Health Visit.      Immunization History   Administered Date(s) Administered   • Covid-19 Vaccine Pfizer 30 mcg/0.3 ml 12/28/2020, 01/21/2021, 10/21/2021   • Influenza 10/01/2015, 10 Negative. Cardiovascular: Negative. Gastrointestinal: Negative. Endocrine: Negative. Genitourinary: Negative. Musculoskeletal: Negative. Skin: Negative. Allergic/Immunologic: Negative. Neurological: Negative.     Hematological: Negat neck supple. Right lower leg: No edema. Left lower leg: No edema. Lymphadenopathy:      Cervical: No cervical adenopathy. Skin:     General: Skin is warm and dry. Findings: No rash.    Neurological:      Mental Status: He is alert and constance drink plenty of fluids, recheck labs as directed and follow-up in about 4 to 5 months.            Relevant Orders    COMP METABOLIC PANEL (14)    URINALYSIS, ROUTINE    CARD TREADMILL STRESS, ADULT (CPT=93017)    SARS-COV-2 BY PCR (ALINITY)    Routine physi hemorrhage      Amrit Moss MD  10/28/2021  3:15 PM

## 2021-10-28 NOTE — ASSESSMENT & PLAN NOTE
Blood pressure 132/80, pulse 68, temperature 97.4 °F (36.3 °C), temperature source Temporal, resp. rate 16, height 5' 4\" (1.626 m), weight 160 lb (72.6 kg). Blood pressure tends to fluctuate but improves upon recheck.   Currently on losartan 100 mg estefania

## 2021-11-03 ENCOUNTER — LAB ENCOUNTER (OUTPATIENT)
Dept: LAB | Age: 46
End: 2021-11-03
Attending: INTERNAL MEDICINE
Payer: COMMERCIAL

## 2021-11-03 DIAGNOSIS — E55.9 VITAMIN D DEFICIENCY: ICD-10-CM

## 2021-11-03 DIAGNOSIS — Z00.00 ROUTINE PHYSICAL EXAMINATION: ICD-10-CM

## 2021-11-03 PROCEDURE — 82607 VITAMIN B-12: CPT

## 2021-11-03 PROCEDURE — 81001 URINALYSIS AUTO W/SCOPE: CPT

## 2021-11-03 PROCEDURE — 80061 LIPID PANEL: CPT

## 2021-11-03 PROCEDURE — 84443 ASSAY THYROID STIM HORMONE: CPT

## 2021-11-03 PROCEDURE — 36415 COLL VENOUS BLD VENIPUNCTURE: CPT

## 2021-11-03 PROCEDURE — 85025 COMPLETE CBC W/AUTO DIFF WBC: CPT

## 2021-11-03 PROCEDURE — 80053 COMPREHEN METABOLIC PANEL: CPT

## 2021-11-03 PROCEDURE — 82306 VITAMIN D 25 HYDROXY: CPT

## 2021-12-01 ENCOUNTER — NURSE ONLY (OUTPATIENT)
Dept: GASTROENTEROLOGY | Facility: CLINIC | Age: 46
End: 2021-12-01

## 2021-12-01 NOTE — PROGRESS NOTES
Called patient for his scheduled telephone colon screening. Explain to patient the need for consult related to becoming full quickly at meals.      Patient verbalized understanding and agreeable to next available OV with NP to appropriately plan for pro

## 2022-01-05 DIAGNOSIS — I10 ESSENTIAL HYPERTENSION: ICD-10-CM

## 2022-01-07 RX ORDER — DILTIAZEM HYDROCHLORIDE 180 MG/1
CAPSULE, COATED, EXTENDED RELEASE ORAL
Qty: 90 CAPSULE | Refills: 1 | Status: SHIPPED | OUTPATIENT
Start: 2022-01-07

## 2022-01-11 ENCOUNTER — TELEPHONE (OUTPATIENT)
Dept: INTERNAL MEDICINE CLINIC | Facility: CLINIC | Age: 47
End: 2022-01-11

## 2022-01-11 DIAGNOSIS — Z02.89 ENCOUNTER FOR PHYSICAL EXAMINATION RELATED TO EMPLOYMENT: Primary | ICD-10-CM

## 2022-01-11 NOTE — TELEPHONE ENCOUNTER
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   Please reply

## 2022-01-12 ENCOUNTER — HOSPITAL ENCOUNTER (OUTPATIENT)
Dept: GENERAL RADIOLOGY | Age: 47
Discharge: HOME | End: 2022-01-12
Attending: INTERNAL MEDICINE
Payer: COMMERCIAL

## 2022-01-12 DIAGNOSIS — Z02.89 ENCOUNTER FOR PHYSICAL EXAMINATION RELATED TO EMPLOYMENT: ICD-10-CM

## 2022-01-12 PROCEDURE — 71046 X-RAY EXAM CHEST 2 VIEWS: CPT | Performed by: INTERNAL MEDICINE

## 2022-02-15 ENCOUNTER — LAB ENCOUNTER (OUTPATIENT)
Dept: LAB | Facility: HOSPITAL | Age: 47
End: 2022-02-15
Attending: INTERNAL MEDICINE
Payer: COMMERCIAL

## 2022-02-15 DIAGNOSIS — I10 PRIMARY HYPERTENSION: ICD-10-CM

## 2022-02-15 LAB — SARS-COV-2 RNA RESP QL NAA+PROBE: NOT DETECTED

## 2022-02-18 ENCOUNTER — HOSPITAL ENCOUNTER (OUTPATIENT)
Dept: CV DIAGNOSTICS | Facility: HOSPITAL | Age: 47
Discharge: HOME | End: 2022-02-18
Attending: INTERNAL MEDICINE
Payer: COMMERCIAL

## 2022-02-18 DIAGNOSIS — I10 PRIMARY HYPERTENSION: ICD-10-CM

## 2022-02-18 PROCEDURE — 93017 CV STRESS TEST TRACING ONLY: CPT | Performed by: INTERNAL MEDICINE

## 2022-02-18 PROCEDURE — 93018 CV STRESS TEST I&R ONLY: CPT | Performed by: INTERNAL MEDICINE

## 2022-02-24 ENCOUNTER — OFFICE VISIT (OUTPATIENT)
Dept: INTERNAL MEDICINE CLINIC | Facility: CLINIC | Age: 47
End: 2022-02-24
Payer: COMMERCIAL

## 2022-02-24 ENCOUNTER — LAB ENCOUNTER (OUTPATIENT)
Dept: LAB | Facility: HOSPITAL | Age: 47
End: 2022-02-24
Attending: INTERNAL MEDICINE
Payer: COMMERCIAL

## 2022-02-24 VITALS
HEIGHT: 64 IN | BODY MASS INDEX: 27.66 KG/M2 | HEART RATE: 67 BPM | SYSTOLIC BLOOD PRESSURE: 138 MMHG | DIASTOLIC BLOOD PRESSURE: 94 MMHG | TEMPERATURE: 97 F | RESPIRATION RATE: 16 BRPM | WEIGHT: 162 LBS

## 2022-02-24 DIAGNOSIS — Z12.5 PROSTATE CANCER SCREENING: ICD-10-CM

## 2022-02-24 DIAGNOSIS — I10 PRIMARY HYPERTENSION: ICD-10-CM

## 2022-02-24 DIAGNOSIS — E55.9 VITAMIN D DEFICIENCY: ICD-10-CM

## 2022-02-24 DIAGNOSIS — Z12.11 ENCOUNTER FOR SCREENING COLONOSCOPY: Primary | ICD-10-CM

## 2022-02-24 LAB
ALBUMIN SERPL-MCNC: 4.4 G/DL (ref 3.4–5)
ALBUMIN/GLOB SERPL: 1.3 {RATIO} (ref 1–2)
ALP LIVER SERPL-CCNC: 48 U/L
ALT SERPL-CCNC: 52 U/L
ANION GAP SERPL CALC-SCNC: 5 MMOL/L (ref 0–18)
AST SERPL-CCNC: 22 U/L (ref 15–37)
BILIRUB SERPL-MCNC: 0.5 MG/DL (ref 0.1–2)
BILIRUB UR QL: NEGATIVE
BUN BLD-MCNC: 14 MG/DL (ref 7–18)
BUN/CREAT SERPL: 13.1 (ref 10–20)
CALCIUM BLD-MCNC: 9.2 MG/DL (ref 8.5–10.1)
CHLORIDE SERPL-SCNC: 104 MMOL/L (ref 98–112)
CLARITY UR: CLEAR
CO2 SERPL-SCNC: 30 MMOL/L (ref 21–32)
COLOR UR: YELLOW
COMPLEXED PSA SERPL-MCNC: 2.55 NG/ML (ref ?–4)
FASTING STATUS PATIENT QL REPORTED: NO
GLOBULIN PLAS-MCNC: 3.3 G/DL (ref 2.8–4.4)
GLUCOSE BLD-MCNC: 90 MG/DL (ref 70–99)
GLUCOSE UR-MCNC: NEGATIVE MG/DL
KETONES UR-MCNC: NEGATIVE MG/DL
LEUKOCYTE ESTERASE UR QL STRIP.AUTO: NEGATIVE
NITRITE UR QL STRIP.AUTO: NEGATIVE
OSMOLALITY SERPL CALC.SUM OF ELEC: 288 MOSM/KG (ref 275–295)
PH UR: 6 [PH] (ref 5–8)
POTASSIUM SERPL-SCNC: 4.2 MMOL/L (ref 3.5–5.1)
PROT SERPL-MCNC: 7.7 G/DL (ref 6.4–8.2)
PROT UR-MCNC: NEGATIVE MG/DL
SODIUM SERPL-SCNC: 139 MMOL/L (ref 136–145)
SP GR UR STRIP: 1.01 (ref 1–1.03)
UROBILINOGEN UR STRIP-ACNC: <2

## 2022-02-24 PROCEDURE — 99214 OFFICE O/P EST MOD 30 MIN: CPT | Performed by: INTERNAL MEDICINE

## 2022-02-24 PROCEDURE — 36415 COLL VENOUS BLD VENIPUNCTURE: CPT

## 2022-02-24 PROCEDURE — 81001 URINALYSIS AUTO W/SCOPE: CPT

## 2022-02-24 PROCEDURE — 80053 COMPREHEN METABOLIC PANEL: CPT

## 2022-02-24 PROCEDURE — 3075F SYST BP GE 130 - 139MM HG: CPT | Performed by: INTERNAL MEDICINE

## 2022-02-24 PROCEDURE — 3008F BODY MASS INDEX DOCD: CPT | Performed by: INTERNAL MEDICINE

## 2022-02-24 PROCEDURE — 3080F DIAST BP >= 90 MM HG: CPT | Performed by: INTERNAL MEDICINE

## 2022-02-24 RX ORDER — BUMETANIDE 0.5 MG/1
TABLET ORAL
Qty: 40 TABLET | Refills: 2 | Status: SHIPPED | OUTPATIENT
Start: 2022-02-24

## 2022-03-08 NOTE — PROGRESS NOTES
Please have the patient set up an appointment for CT scan as well as follow-up with urology as recommended.

## 2022-03-09 ENCOUNTER — TELEPHONE (OUTPATIENT)
Dept: INTERNAL MEDICINE CLINIC | Facility: CLINIC | Age: 47
End: 2022-03-09

## 2022-03-09 ENCOUNTER — NURSE ONLY (OUTPATIENT)
Dept: INTERNAL MEDICINE CLINIC | Facility: CLINIC | Age: 47
End: 2022-03-09
Payer: COMMERCIAL

## 2022-03-09 VITALS — HEART RATE: 73 BPM | SYSTOLIC BLOOD PRESSURE: 126 MMHG | DIASTOLIC BLOOD PRESSURE: 86 MMHG

## 2022-03-09 DIAGNOSIS — Z01.30 BP CHECK: Primary | ICD-10-CM

## 2022-03-09 PROCEDURE — 3074F SYST BP LT 130 MM HG: CPT | Performed by: INTERNAL MEDICINE

## 2022-03-09 PROCEDURE — 3079F DIAST BP 80-89 MM HG: CPT | Performed by: INTERNAL MEDICINE

## 2022-03-09 NOTE — TELEPHONE ENCOUNTER
Rec'd lyn message from Dr. Franklyn Duncan to record bp readings and have him continue on the same medications. S/w pt and informed him of Dr. Stefany Raimrez message. He verbalized understanding.

## 2022-03-09 NOTE — PROGRESS NOTES
Pt arrived at the office for a BP check. Order can be seen in office notes 22. Pt verified name and . Verified medications with patient. He is currently taking bumex 0.5mg 1 tablet 3 times per week, diltiazem 180mg daily, and losartan potassium 100mg daily. Denies any chest pain, sob, dizziness, or light headedness. First blood pressure reading was 132/86 with a pulse of 59. Second blood pressure reading was 126/86 with a pulse of 73. Pt had brought his bp machine to the office today and we rec'd readings of 125/90 with a pulse of 63, and 127/91 with a pulse of 63. Informed him I will send a message to Dr. Helder Barton with any further instructions. He verbalized understanding.

## 2022-03-09 NOTE — TELEPHONE ENCOUNTER
Pt arrived at the office for a BP check. Order can be seen in office notes 22. Pt verified name and . Verified medications with patient. He is currently taking bumex 0.5mg 1 tablet 3 times per week, diltiazem hcl 180mg daily, and losartan potassium 100mg daily. Denies any chest pain, sob, dizziness, or light headedness. First blood pressure reading was 132/86 with a pulse of 59. Second blood pressure reading was 126/86 with a pulse of 73. Pt had brought his bp machine to the office today and we rec'd readings of 125/90 with a pulse of 63, and 127/91 with a pulse of 63. Pls advise if there is any further instructions.

## 2022-04-03 DIAGNOSIS — I10 ESSENTIAL HYPERTENSION: ICD-10-CM

## 2022-04-03 NOTE — TELEPHONE ENCOUNTER
Refill passed per [facility] protocol, but last filled by Dr. Lyudmila Cleveland 9/18/2021.     Please send, if appropriate    Routed to Dr. Jcarlos Alicea for refill review and response    Requested Prescriptions   Pending Prescriptions Disp Refills    LOSARTAN 100 MG Oral Tab [Pharmacy Med Name: LOSARTAN POTASSIUM 100 MG TAB] 90 tablet 1     Sig: TAKE 1 TABLET BY MOUTH EVERY DAY        Hypertensive Medications Protocol Passed - 4/3/2022  7:35 AM        Passed - CMP or BMP in past 12 months        Passed - Appointment in past 6 or next 3 months        Passed - GFR Non- > 50     Lab Results   Component Value Date    GFRNAA 82 02/24/2022                         Recent Outpatient Visits              3 weeks ago BP check    [facility], [facility], [address] [place] [address] Only    1 month ago Encounter for screening colonoscopy    [facility], Kemar Duron MD    Office Visit    4 months ago     [facility] 55 GI PROCEDURE    Nurse Only    5 months ago Routine physical examination    Shawnee Gallego MD    Office Visit    7 months ago Neck pain on left side    Cristina Ríos MD    Office Visit

## 2022-04-04 RX ORDER — LOSARTAN POTASSIUM 100 MG/1
TABLET ORAL
Qty: 90 TABLET | Refills: 1 | OUTPATIENT
Start: 2022-04-04

## (undated) NOTE — MR AVS SNAPSHOT
[address] [address]  [phone]               Thank you for choosing us for your health care visit with Kathleen Paulino MD.  We are glad to serve you and happy to provide you with this summary Normal exam.  Labs completed recently looked stable. Skin check with a few scattered nevi–none suspicious. Hernial orifices intact. No cervical, inguinal, axillary lymphadenopathy. Rectal exam completed–brown stools guaiac negative.   No palpable abno Assoc Dx:  Essential hypertension [I10], Proteinuria, unspecified type [R80.9]           Microalb/Creat Ratio, Random Urine [E]    Complete by:  Feb 14, 2017    Assoc Dx:  Essential hypertension [I10], Proteinuria, unspecified type [R80.9]           Vitam To schedule Physical Therapy at any of the [facility] facilities, please call [phone]. [place] for DAVID FANG [facility]  [address],[place] [address]  [address] [address] [address], [address]    [address] drinks, candies and desserts   Eat plenty of low-fat dairy products High fat meats and dairy   Choose whole grain products Foods high in sodium   Water is best for hydration Fast food.    Eat at home when possible     Tips for increasing your physical activ

## (undated) NOTE — LETTER
[facility], [place]  Authorization for Invasive Procedures  1.  I hereby authorize Dr. Graeme Bach , my physician and whomever may be designated as the doctor's assistant, to perform the following operation and/or procedure:  Right 4. Should the need arise during my operation or immediate post-operative period; I also consent to the administration of blood and/or blood products.  Further, I understand that despite careful testing and screening of blood and blood products, I may still 9. Patients having a sterilization procedure: I understand that if the procedure is successful the results will be permanent and it will therefore be impossible for me to inseminate, conceive or bear children.  I also understand that the procedure is intend

## (undated) NOTE — LETTER
December 13, 2018         [place], [address]  [id number]      Patient: Vee Kenney   YOB: 1975   Date of Visit: 12/13/2018       Dear Dr. Jessika Melissa MD,    I saw your patient, Vee Kenney, on 12/13/2018.

## (undated) NOTE — MR AVS SNAPSHOT
[address] [address]  [phone]               Thank you for choosing us for your health care visit with Tavares Salcido MD.  We are glad to serve you and happy to provide you with this summary Blood pressure seems well controlled, advised to continue on losartan at 100 mg 1 tablet once daily and diltiazem at 180 mg 1 tablet once daily. Recent labs showed normal EGFR as well as creatinine. Urine protein levels remain undetectable.   Drink plenty You can access your MyChart to more actively manage your health care and view more details from this visit by going to [email or web address].   If you've recently had a stay at the Hospital you can access your discharge instructions in [address] by rivka

## (undated) NOTE — LETTER
Earline Pedroza, [address] [facility]       04/16/19        Patient: Hadye Sol   YOB: 1975   Date of Visit: 4/5/2019       Dear  Dr. Erin Carreno MD,      Thank you for referring Hayde Sol to my practice.   Pl